# Patient Record
Sex: FEMALE | Race: BLACK OR AFRICAN AMERICAN | NOT HISPANIC OR LATINO | URBAN - METROPOLITAN AREA
[De-identification: names, ages, dates, MRNs, and addresses within clinical notes are randomized per-mention and may not be internally consistent; named-entity substitution may affect disease eponyms.]

---

## 2018-11-13 ENCOUNTER — EMERGENCY (EMERGENCY)
Facility: HOSPITAL | Age: 53
LOS: 0 days | Discharge: HOME | End: 2018-11-13
Attending: EMERGENCY MEDICINE
Payer: OTHER MISCELLANEOUS

## 2018-11-13 VITALS
HEART RATE: 61 BPM | OXYGEN SATURATION: 97 % | SYSTOLIC BLOOD PRESSURE: 130 MMHG | RESPIRATION RATE: 16 BRPM | DIASTOLIC BLOOD PRESSURE: 77 MMHG

## 2018-11-13 VITALS
HEIGHT: 68 IN | OXYGEN SATURATION: 100 % | TEMPERATURE: 98 F | WEIGHT: 179.9 LBS | DIASTOLIC BLOOD PRESSURE: 86 MMHG | HEART RATE: 62 BPM | SYSTOLIC BLOOD PRESSURE: 139 MMHG | RESPIRATION RATE: 16 BRPM
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
## 2018-11-13 DIAGNOSIS — Z98.89 OTHER SPECIFIED POSTPROCEDURAL STATES: Chronic | ICD-10-CM

## 2018-11-13 DIAGNOSIS — E11.9 TYPE 2 DIABETES MELLITUS WITHOUT COMPLICATIONS: ICD-10-CM
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
## 2018-11-13 DIAGNOSIS — Z79.4 LONG TERM (CURRENT) USE OF INSULIN: ICD-10-CM

## 2018-11-13 DIAGNOSIS — Z88.9 ALLERGY STATUS TO UNSPECIFIED DRUGS, MEDICAMENTS AND BIOLOGICAL SUBSTANCES: ICD-10-CM

## 2018-11-13 DIAGNOSIS — T14.8XXA OTHER INJURY OF UNSPECIFIED BODY REGION, INITIAL ENCOUNTER: ICD-10-CM

## 2018-11-13 DIAGNOSIS — Z91.040 LATEX ALLERGY STATUS: ICD-10-CM

## 2018-11-13 DIAGNOSIS — E66.9 OBESITY, UNSPECIFIED: ICD-10-CM

## 2018-11-13 DIAGNOSIS — M79.662 PAIN IN LEFT LOWER LEG: ICD-10-CM

## 2018-11-13 DIAGNOSIS — E78.5 HYPERLIPIDEMIA, UNSPECIFIED: ICD-10-CM

## 2018-11-13 DIAGNOSIS — G47.33 OBSTRUCTIVE SLEEP APNEA (ADULT) (PEDIATRIC): ICD-10-CM

## 2018-11-13 DIAGNOSIS — D25.9 LEIOMYOMA OF UTERUS, UNSPECIFIED: Chronic | ICD-10-CM

## 2018-11-13 DIAGNOSIS — Z99.89 DEPENDENCE ON OTHER ENABLING MACHINES AND DEVICES: ICD-10-CM

## 2018-11-13 PROCEDURE — 99053 MED SERV 10PM-8AM 24 HR FAC: CPT

## 2018-11-13 PROCEDURE — 99282 EMERGENCY DEPT VISIT SF MDM: CPT | Mod: 25

## 2018-11-13 RX ORDER — ACETAMINOPHEN 500 MG
975 TABLET ORAL ONCE
Qty: 0 | Refills: 0 | Status: COMPLETED | OUTPATIENT
Start: 2018-11-13 | End: 2018-11-13

## 2018-11-13 RX ADMIN — Medication 975 MILLIGRAM(S): at 03:11

## 2018-11-13 NOTE — ED ADULT NURSE NOTE - NSIMPLEMENTINTERV_GEN_ALL_ED
Implemented All Fall Risk Interventions:  [place] to call system. Call bell, personal items and telephone within reach. Instruct patient to call for assistance. Room bathroom lighting operational. Non-slip footwear when patient is off stretcher. Physically safe environment: no spills, clutter or unnecessary equipment. Stretcher in lowest position, wheels locked, appropriate side rails in place. Provide visual cue, wrist band, yellow gown, etc. Monitor gait and stability. Monitor for mental status changes and reorient to person, place, and time. Review medications for side effects contributing to fall risk. Reinforce activity limits and safety measures with patient and family.

## 2018-11-13 NOTE — ED PROVIDER NOTE - MEDICAL DECISION MAKING DETAILS
patient pw superficial skin abrasion and likely contusion to the shin muscle. dc with nsaid therapy.

## 2018-11-13 NOTE — ED PROVIDER NOTE - PHYSICAL EXAMINATION
Gen: Alert, NAD  Head: NC, AT   Eyes: PERRL, EOMI, normal lids/conjunctiva  ENT: normal hearing, patent oropharynx without erythema/exudate, uvula midline  Neck: supple, no tenderness, Trachea midline  Pulm: Bilateral BS, normal resp effort, no wheeze/stridor/retractions  CV: RRR, no M/R/G, 2+ radial and dp pulses bl, no edema  Abd: soft, NT/ND, +BS, no hepatosplenomegaly  Mskel: extremities x4 with normal ROM and no joint effusions. no ctl spine ttp.   Skin: abrasion left anterior shin. very superficial   Neuro: AAOx3, no sensory/motor deficits, CN 2-12 intact

## 2018-11-13 NOTE — ED PROVIDER NOTE - OBJECTIVE STATEMENT
Pertinent PMH/PSH/FHx/SHx and Review of Systems contained within:  53F no relevant med hx pw left shin pain sp mech trip and fall onto the bus. she did not hit head or sustain loc. there was no bleeding. patient did not take anything for pain. there is an abrasion on the left shin. she is ambulatory  Fh and Sh not otherwise contributory  ROS otherwise negative

## 2018-11-13 NOTE — ED ADULT TRIAGE NOTE - CHIEF COMPLAINT QUOTE
BIBA, from work, pt c/o L-shin pain/abrasion .  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  unknown tetatnus.  post- menopausal

## 2018-11-13 NOTE — ED ADULT NURSE NOTE - PSH
Fibroids  s/p uterine artery embolization 2008  S/P D&C (status post dilation and curettage)  myomectomy 2008  S/P D&C (status post dilation and curettage)  TOP A x1  S/P myomectomy  abdominal 1989/ 2004

## 2018-11-13 NOTE — ED ADULT NURSE NOTE - PMH
Diabetes  diagnosed 2009   fasting 110  metformin 2x day  Environmental allergies  mucinex prn  Hiatal hernia    Hyperlipidemia    Latex allergy    Obesity    JUAQUIN on CPAP

## 2020-05-22 ENCOUNTER — TRANSCRIPTION ENCOUNTER (OUTPATIENT)
Age: 55
End: 2020-05-22

## 2020-07-05 ENCOUNTER — EMERGENCY (EMERGENCY)
Facility: HOSPITAL | Age: 55
LOS: 0 days | Discharge: HOME | End: 2020-07-06
Attending: EMERGENCY MEDICINE
Payer: COMMERCIAL

## 2020-07-05 VITALS
TEMPERATURE: 98 F | SYSTOLIC BLOOD PRESSURE: 130 MMHG | DIASTOLIC BLOOD PRESSURE: 73 MMHG | OXYGEN SATURATION: 97 % | RESPIRATION RATE: 17 BRPM | HEART RATE: 62 BPM | HEIGHT: 68 IN | WEIGHT: 190.04 LBS

## 2020-07-05 DIAGNOSIS — Z98.89 OTHER SPECIFIED POSTPROCEDURAL STATES: Chronic | ICD-10-CM

## 2020-07-05 DIAGNOSIS — D25.9 LEIOMYOMA OF UTERUS, UNSPECIFIED: Chronic | ICD-10-CM

## 2020-07-05 PROCEDURE — 99285 EMERGENCY DEPT VISIT HI MDM: CPT

## 2020-07-05 RX ORDER — KETOROLAC TROMETHAMINE 30 MG/ML
30 SYRINGE (ML) INJECTION ONCE
Refills: 0 | Status: DISCONTINUED | OUTPATIENT
Start: 2020-07-05 | End: 2020-07-05

## 2020-07-05 RX ORDER — SODIUM CHLORIDE 9 MG/ML
1000 INJECTION INTRAMUSCULAR; INTRAVENOUS; SUBCUTANEOUS ONCE
Refills: 0 | Status: COMPLETED | OUTPATIENT
Start: 2020-07-05 | End: 2020-07-05

## 2020-07-05 RX ORDER — ONDANSETRON 8 MG/1
4 TABLET, FILM COATED ORAL ONCE
Refills: 0 | Status: COMPLETED | OUTPATIENT
Start: 2020-07-05 | End: 2020-07-05

## 2020-07-05 NOTE — ED PROVIDER NOTE - CARE PROVIDER_API CALL
Hossein De Oliveira  ORTHOPAEDIC SURGERY  [address] [place] [address]  Phone: [phone]  Fax: [phone]  Follow Up Time:

## 2020-07-05 NOTE — ED PROVIDER NOTE - PATIENT PORTAL LINK FT
You can access the FollowMyHealth Patient Portal offered by [facility] by registering at the following website: [email or web address]. By joining [employer]’s FollowMyHealth portal, you will also be able to view your health information using other applications (apps) compatible with our system.

## 2020-07-05 NOTE — ED ADULT TRIAGE NOTE - TEMPERATURE IN CELSIUS (DEGREES C)
Detail Level: Simple
Body Location Override (Optional - Billing Will Still Be Based On Selected Body Map Location If Applicable): left posterior shoulder
Add 15521 Cpt? (Important Note: In 2017 The Use Of 57237 Is Being Tracked By Cms To Determine Future Global Period Reimbursement For Global Periods): yes
36.7

## 2020-07-05 NOTE — ED PROVIDER NOTE - OBJECTIVE STATEMENT
Pertinent PMH/PSH/FHx/SHx and Review of Systems contained within:    55yo F w PMH of HL, hiatal hernia, obesity s/p gastric sleeve, s/p myomectomy x2 presents to ED for eval of back pain today.  Pt states she has been moving furniture and has been having mild pain, which she has been taking 2 tabs of tylenol for.  Today pt had episode of more severe back pain.  Pt states she has COVID Ab.  Pt states Pertinent PMH/PSH/FHx/SHx and Review of Systems contained within:    53yo F w PMH of HL, hiatal hernia, obesity s/p gastric sleeve, s/p myomectomy x2 presents to ED for eval of back pain today.  Pt states she has been moving furniture and has been having mild pain, which she has been taking 2 tabs of tylenol daily for w limited relief.  Today pt had episode of more severe back pain, described as sharp, then resolved.  Pt states she previously had COVID, +Ab. Pt states when pain occurred she had nausea, no vomiting.  Pain then felt like it radiated to abdomen.  Currently in ED pt states pain not as severe.    No fever/chills, No photophobia/eye pain/changes in vision, No ear pain/sore throat/dysphagia, No chest pain/palpitations, no SOB/cough/wheeze/stridor, No abdominal pain, +back pain, no rash, no changes in neurological status/function.

## 2020-07-05 NOTE — ED PROVIDER NOTE - NSFOLLOWUPINSTRUCTIONS_ED_ALL_ED_FT
please take medications as prescribed    follow up with your PMD & orthopedics    return to hospital if you feel worse

## 2020-07-05 NOTE — ED PROVIDER NOTE - CLINICAL SUMMARY MEDICAL DECISION MAKING FREE TEXT BOX
Pt states she is previously aware of liver hemangioma Pt improved in ED w toradol.  Discussed results and outcome of testing with the patient, given copy as well.  Pt states she is previously aware of liver hemangioma, given follow up for back as well.  Patient advised to please follow up with their primary care doctor within the next 24 hours and return to the Emergency Department for worsening symptoms or any other concerns.  Patient advised that their doctor may call  to follow up on the specific results of the tests performed today in the emergency department.

## 2020-07-05 NOTE — ED PROVIDER NOTE - PHYSICAL EXAMINATION
Gen: Alert, NAD, speaking in complete sentences  Head: NC, AT, EOMI, normal lids/conjunctiva  ENT: normal hearing, patent oropharynx, MMM  Neck: supple, no tenderness/meningismus, FROM, Trachea midline  Pulm: Bilateral clear BS, normal resp effort, no wheeze/stridor/retractions  CV: RRR, no M/R/G, +dist pulses  Abd: soft, NT/ND, +BS, no guarding/rebound tenderness, no CVAT  Mskel: no edema/erythema/cyanosis  Skin: no rash  Neuro: no sensory/motor deficits

## 2020-07-06 ENCOUNTER — TRANSCRIPTION ENCOUNTER (OUTPATIENT)
Age: 55
End: 2020-07-06

## 2020-07-06 VITALS
HEART RATE: 66 BPM | TEMPERATURE: 98 F | OXYGEN SATURATION: 99 % | DIASTOLIC BLOOD PRESSURE: 77 MMHG | RESPIRATION RATE: 16 BRPM | SYSTOLIC BLOOD PRESSURE: 143 MMHG

## 2020-07-06 DIAGNOSIS — Z98.84 BARIATRIC SURGERY STATUS: Chronic | ICD-10-CM

## 2020-07-06 LAB
ALBUMIN SERPL ELPH-MCNC: 3.5 G/DL — SIGNIFICANT CHANGE UP (ref 3.3–5)
ALP SERPL-CCNC: 77 U/L — SIGNIFICANT CHANGE UP (ref 40–120)
ALT FLD-CCNC: 22 U/L — SIGNIFICANT CHANGE UP (ref 12–78)
ANION GAP SERPL CALC-SCNC: 4 MMOL/L — LOW (ref 5–17)
APPEARANCE UR: CLEAR — SIGNIFICANT CHANGE UP
APTT BLD: 31.4 SEC — SIGNIFICANT CHANGE UP (ref 27.5–35.5)
AST SERPL-CCNC: 25 U/L — SIGNIFICANT CHANGE UP (ref 15–37)
BACTERIA # UR AUTO: ABNORMAL
BASOPHILS # BLD AUTO: 0.03 K/UL — SIGNIFICANT CHANGE UP (ref 0–0.2)
BASOPHILS NFR BLD AUTO: 0.4 % — SIGNIFICANT CHANGE UP (ref 0–2)
BILIRUB SERPL-MCNC: 0.6 MG/DL — SIGNIFICANT CHANGE UP (ref 0.2–1.2)
BILIRUB UR-MCNC: NEGATIVE — SIGNIFICANT CHANGE UP
BUN SERPL-MCNC: 13 MG/DL — SIGNIFICANT CHANGE UP (ref 7–23)
CALCIUM SERPL-MCNC: 8.8 MG/DL — SIGNIFICANT CHANGE UP (ref 8.5–10.1)
CHLORIDE SERPL-SCNC: 110 MMOL/L — HIGH (ref 96–108)
CO2 SERPL-SCNC: 29 MMOL/L — SIGNIFICANT CHANGE UP (ref 22–31)
COLOR SPEC: YELLOW — SIGNIFICANT CHANGE UP
CREAT SERPL-MCNC: 0.61 MG/DL — SIGNIFICANT CHANGE UP (ref 0.5–1.3)
DIFF PNL FLD: ABNORMAL
EOSINOPHIL # BLD AUTO: 0.08 K/UL — SIGNIFICANT CHANGE UP (ref 0–0.5)
EOSINOPHIL NFR BLD AUTO: 1.1 % — SIGNIFICANT CHANGE UP (ref 0–6)
EPI CELLS # UR: SIGNIFICANT CHANGE UP
GLUCOSE SERPL-MCNC: 161 MG/DL — HIGH (ref 70–99)
GLUCOSE UR QL: NEGATIVE MG/DL — SIGNIFICANT CHANGE UP
HCG SERPL-ACNC: <1 MIU/ML — SIGNIFICANT CHANGE UP
HCT VFR BLD CALC: 35.5 % — SIGNIFICANT CHANGE UP (ref 34.5–45)
HGB BLD-MCNC: 11.1 G/DL — LOW (ref 11.5–15.5)
IMM GRANULOCYTES NFR BLD AUTO: 0.4 % — SIGNIFICANT CHANGE UP (ref 0–1.5)
INR BLD: 1.1 RATIO — SIGNIFICANT CHANGE UP (ref 0.88–1.16)
KETONES UR-MCNC: NEGATIVE — SIGNIFICANT CHANGE UP
LEUKOCYTE ESTERASE UR-ACNC: ABNORMAL
LIDOCAIN IGE QN: 154 U/L — SIGNIFICANT CHANGE UP (ref 73–393)
LYMPHOCYTES # BLD AUTO: 1.45 K/UL — SIGNIFICANT CHANGE UP (ref 1–3.3)
LYMPHOCYTES # BLD AUTO: 20.1 % — SIGNIFICANT CHANGE UP (ref 13–44)
MCHC RBC-ENTMCNC: 27.3 PG — SIGNIFICANT CHANGE UP (ref 27–34)
MCHC RBC-ENTMCNC: 31.3 GM/DL — LOW (ref 32–36)
MCV RBC AUTO: 87.2 FL — SIGNIFICANT CHANGE UP (ref 80–100)
MONOCYTES # BLD AUTO: 0.56 K/UL — SIGNIFICANT CHANGE UP (ref 0–0.9)
MONOCYTES NFR BLD AUTO: 7.8 % — SIGNIFICANT CHANGE UP (ref 2–14)
NEUTROPHILS # BLD AUTO: 5.07 K/UL — SIGNIFICANT CHANGE UP (ref 1.8–7.4)
NEUTROPHILS NFR BLD AUTO: 70.2 % — SIGNIFICANT CHANGE UP (ref 43–77)
NITRITE UR-MCNC: NEGATIVE — SIGNIFICANT CHANGE UP
NRBC # BLD: 0 /100 WBCS — SIGNIFICANT CHANGE UP (ref 0–0)
PH UR: 5 — SIGNIFICANT CHANGE UP (ref 5–8)
PLATELET # BLD AUTO: 284 K/UL — SIGNIFICANT CHANGE UP (ref 150–400)
POTASSIUM SERPL-MCNC: 4.2 MMOL/L — SIGNIFICANT CHANGE UP (ref 3.5–5.3)
POTASSIUM SERPL-SCNC: 4.2 MMOL/L — SIGNIFICANT CHANGE UP (ref 3.5–5.3)
PROT SERPL-MCNC: 7.4 GM/DL — SIGNIFICANT CHANGE UP (ref 6–8.3)
PROT UR-MCNC: 15 MG/DL
PROTHROM AB SERPL-ACNC: 12.3 SEC — SIGNIFICANT CHANGE UP (ref 10.6–13.6)
RBC # BLD: 4.07 M/UL — SIGNIFICANT CHANGE UP (ref 3.8–5.2)
RBC # FLD: 13.5 % — SIGNIFICANT CHANGE UP (ref 10.3–14.5)
RBC CASTS # UR COMP ASSIST: ABNORMAL /HPF (ref 0–4)
SARS-COV-2 RNA SPEC QL NAA+PROBE: SIGNIFICANT CHANGE UP
SODIUM SERPL-SCNC: 143 MMOL/L — SIGNIFICANT CHANGE UP (ref 135–145)
SP GR SPEC: 1.02 — SIGNIFICANT CHANGE UP (ref 1.01–1.02)
UROBILINOGEN FLD QL: 1 MG/DL
WBC # BLD: 7.22 K/UL — SIGNIFICANT CHANGE UP (ref 3.8–10.5)
WBC # FLD AUTO: 7.22 K/UL — SIGNIFICANT CHANGE UP (ref 3.8–10.5)
WBC UR QL: SIGNIFICANT CHANGE UP

## 2020-07-06 PROCEDURE — 74176 CT ABD & PELVIS W/O CONTRAST: CPT | Mod: 26

## 2020-07-06 PROCEDURE — 71045 X-RAY EXAM CHEST 1 VIEW: CPT | Mod: 26

## 2020-07-06 RX ORDER — AZITHROMYCIN 500 MG/1
500 TABLET, FILM COATED ORAL ONCE
Refills: 0 | Status: COMPLETED | OUTPATIENT
Start: 2020-07-06 | End: 2020-07-06

## 2020-07-06 RX ORDER — AZITHROMYCIN 500 MG/1
1 TABLET, FILM COATED ORAL
Qty: 4 | Refills: 0
Start: 2020-07-06 | End: 2020-07-09

## 2020-07-06 RX ORDER — METHOCARBAMOL 500 MG/1
1 TABLET, FILM COATED ORAL
Qty: 15 | Refills: 0
Start: 2020-07-06 | End: 2020-07-10

## 2020-07-06 RX ADMIN — SODIUM CHLORIDE 1000 MILLILITER(S): 9 INJECTION INTRAMUSCULAR; INTRAVENOUS; SUBCUTANEOUS at 00:54

## 2020-07-06 RX ADMIN — ONDANSETRON 4 MILLIGRAM(S): 8 TABLET, FILM COATED ORAL at 00:53

## 2020-07-06 RX ADMIN — Medication 1 TABLET(S): at 03:04

## 2020-07-06 RX ADMIN — AZITHROMYCIN 500 MILLIGRAM(S): 500 TABLET, FILM COATED ORAL at 03:04

## 2020-07-06 RX ADMIN — Medication 30 MILLIGRAM(S): at 00:53

## 2020-07-06 NOTE — ED ADULT NURSE NOTE - OBJECTIVE STATEMENT
C/O new onset of left flank pain while seated on the couch. Pain surged with severe nausea as she was about to get up of the couch.

## 2020-07-06 NOTE — ED ADULT NURSE NOTE - PSH
Fibroids  s/p uterine artery embolization 2008  H/O bariatric surgery  gastric SLEEVE  S/P D&C (status post dilation and curettage)  myomectomy 2008  S/P D&C (status post dilation and curettage)  TOP A x1  S/P myomectomy  abdominal 1989/ 2004

## 2020-07-07 DIAGNOSIS — Z91.040 LATEX ALLERGY STATUS: ICD-10-CM

## 2020-07-07 DIAGNOSIS — J18.8 OTHER PNEUMONIA, UNSPECIFIED ORGANISM: ICD-10-CM

## 2020-07-07 DIAGNOSIS — R10.9 UNSPECIFIED ABDOMINAL PAIN: ICD-10-CM

## 2020-07-07 DIAGNOSIS — M54.9 DORSALGIA, UNSPECIFIED: ICD-10-CM

## 2020-07-07 LAB
CULTURE RESULTS: SIGNIFICANT CHANGE UP
SPECIMEN SOURCE: SIGNIFICANT CHANGE UP

## 2020-08-11 ENCOUNTER — EMERGENCY (EMERGENCY)
Facility: HOSPITAL | Age: 55
LOS: 0 days | Discharge: HOME | End: 2020-08-11
Attending: EMERGENCY MEDICINE
Payer: COMMERCIAL

## 2020-08-11 VITALS
HEART RATE: 68 BPM | OXYGEN SATURATION: 98 % | DIASTOLIC BLOOD PRESSURE: 60 MMHG | SYSTOLIC BLOOD PRESSURE: 116 MMHG | RESPIRATION RATE: 18 BRPM | TEMPERATURE: 99 F

## 2020-08-11 VITALS
HEART RATE: 90 BPM | OXYGEN SATURATION: 98 % | RESPIRATION RATE: 20 BRPM | DIASTOLIC BLOOD PRESSURE: 65 MMHG | SYSTOLIC BLOOD PRESSURE: 153 MMHG

## 2020-08-11 DIAGNOSIS — Z91.040 LATEX ALLERGY STATUS: ICD-10-CM

## 2020-08-11 DIAGNOSIS — Z98.84 BARIATRIC SURGERY STATUS: Chronic | ICD-10-CM

## 2020-08-11 DIAGNOSIS — Z98.89 OTHER SPECIFIED POSTPROCEDURAL STATES: Chronic | ICD-10-CM

## 2020-08-11 DIAGNOSIS — M25.552 PAIN IN LEFT HIP: ICD-10-CM

## 2020-08-11 DIAGNOSIS — Z79.84 LONG TERM (CURRENT) USE OF ORAL HYPOGLYCEMIC DRUGS: ICD-10-CM
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
## 2020-08-11 DIAGNOSIS — K44.9 DIAPHRAGMATIC HERNIA WITHOUT OBSTRUCTION OR GANGRENE: ICD-10-CM

## 2020-08-11 DIAGNOSIS — E78.5 HYPERLIPIDEMIA, UNSPECIFIED: ICD-10-CM

## 2020-08-11 DIAGNOSIS — E11.9 TYPE 2 DIABETES MELLITUS WITHOUT COMPLICATIONS: ICD-10-CM

## 2020-08-11 DIAGNOSIS — S70.02XA CONTUSION OF LEFT HIP, INITIAL ENCOUNTER: ICD-10-CM

## 2020-08-11 DIAGNOSIS — G47.33 OBSTRUCTIVE SLEEP APNEA (ADULT) (PEDIATRIC): ICD-10-CM

## 2020-08-11 DIAGNOSIS — D25.9 LEIOMYOMA OF UTERUS, UNSPECIFIED: Chronic | ICD-10-CM

## 2020-08-11 PROCEDURE — 72100 X-RAY EXAM L-S SPINE 2/3 VWS: CPT | Mod: 26

## 2020-08-11 PROCEDURE — 99284 EMERGENCY DEPT VISIT MOD MDM: CPT

## 2020-08-11 PROCEDURE — 73502 X-RAY EXAM HIP UNI 2-3 VIEWS: CPT | Mod: 26,LT

## 2020-08-11 RX ORDER — IBUPROFEN 200 MG
1 TABLET ORAL
Qty: 15 | Refills: 0
Start: 2020-08-11 | End: 2020-08-15

## 2020-08-11 RX ORDER — KETOROLAC TROMETHAMINE 30 MG/ML
30 SYRINGE (ML) INJECTION ONCE
Refills: 0 | Status: DISCONTINUED | OUTPATIENT
Start: 2020-08-11 | End: 2020-08-11

## 2020-08-11 RX ADMIN — Medication 30 MILLIGRAM(S): at 09:25

## 2020-08-11 RX ADMIN — Medication 30 MILLIGRAM(S): at 09:36

## 2020-08-11 NOTE — ED PROVIDER NOTE - PATIENT PORTAL LINK FT
You can access the FollowMyHealth Patient Portal offered by [facility] by registering at the following website: [email or web address]. By joining [employer]’s FollowMyHealth portal, you will also be able to view your health information using other applications (apps) compatible with our system.

## 2020-08-11 NOTE — ED ADULT NURSE NOTE - NSIMPLEMENTINTERV_GEN_ALL_ED
Implemented All Universal Safety Interventions:  [place] to call system. Call bell, personal items and telephone within reach. Instruct patient to call for assistance. Room bathroom lighting operational. Non-slip footwear when patient is off stretcher. Physically safe environment: no spills, clutter or unnecessary equipment. Stretcher in lowest position, wheels locked, appropriate side rails in place.

## 2020-08-11 NOTE — ED PROVIDER NOTE - OBJECTIVE STATEMENT
55 years old female brought in by her mom c/o left hip and buttock pain after she slipped and fell on her buttock 19:00 pm last night. Pt is denies trauma to the head, headache, loc, dizziness, neck pain, blurred visions, light sensitivities, focal/distal weakness or numbness, cough, sob, chest pain, nausea, vomiting, fever, chills, abd pain, dysuria, hematuria, vaginal spotting or discharge or irregular bowel movements. Pt's last menstrual cycle was 5 years ago sts she is not at risk of being pregnant.

## 2020-08-11 NOTE — ED ADULT TRIAGE NOTE - CHIEF COMPLAINT QUOTE
pt s/p mechanical fall landed on buttocks, did not strike her head, +Left hip pain, denies anticoagulation use, ambulatory in triage

## 2020-08-11 NOTE — ED PROVIDER NOTE - PROGRESS NOTE DETAILS
Pt sts she is feeling better again pt is able to ambulate without assists. Pt is given and explained all reports of xray and advised to follow up with ortho and return if symptoms persist or worsen.

## 2020-08-11 NOTE — ED PROVIDER NOTE - CONSTITUTIONAL, MLM
normal... Well appearing, awake, alert, oriented to person, place, time/situation and in no apparent distress. Speaking in clear full sentences no nasal flaring no sentences no nasal flaring no shoulders retractions no diaphoresis, appears comfortable sitting up in the chair in a bright light room

## 2020-08-11 NOTE — ED PROVIDER NOTE - MUSCULOSKELETAL NECK EXAM
No vertebral point tenderness no pain on movement/trachea midline/no deformity, pain or tenderness. no restriction of movement/supple

## 2020-11-11 ENCOUNTER — EMERGENCY (EMERGENCY)
Facility: HOSPITAL | Age: 55
LOS: 0 days | Discharge: HOME | End: 2020-11-11
Payer: COMMERCIAL

## 2020-11-11 VITALS
TEMPERATURE: 98 F | WEIGHT: 210.1 LBS | DIASTOLIC BLOOD PRESSURE: 94 MMHG | HEIGHT: 68 IN | HEART RATE: 58 BPM | OXYGEN SATURATION: 100 % | RESPIRATION RATE: 16 BRPM | SYSTOLIC BLOOD PRESSURE: 153 MMHG

## 2020-11-11 DIAGNOSIS — D25.9 LEIOMYOMA OF UTERUS, UNSPECIFIED: Chronic | ICD-10-CM

## 2020-11-11 DIAGNOSIS — Z91.040 LATEX ALLERGY STATUS: ICD-10-CM

## 2020-11-11 DIAGNOSIS — Z98.89 OTHER SPECIFIED POSTPROCEDURAL STATES: Chronic | ICD-10-CM

## 2020-11-11 DIAGNOSIS — E78.5 HYPERLIPIDEMIA, UNSPECIFIED: ICD-10-CM

## 2020-11-11 DIAGNOSIS — G47.33 OBSTRUCTIVE SLEEP APNEA (ADULT) (PEDIATRIC): ICD-10-CM

## 2020-11-11 DIAGNOSIS — E11.9 TYPE 2 DIABETES MELLITUS WITHOUT COMPLICATIONS: ICD-10-CM

## 2020-11-11 DIAGNOSIS — M54.9 DORSALGIA, UNSPECIFIED: ICD-10-CM

## 2020-11-11 DIAGNOSIS — Z79.84 LONG TERM (CURRENT) USE OF ORAL HYPOGLYCEMIC DRUGS: ICD-10-CM

## 2020-11-11 DIAGNOSIS — Z98.84 BARIATRIC SURGERY STATUS: Chronic | ICD-10-CM

## 2020-11-11 DIAGNOSIS — M54.5 LOW BACK PAIN: ICD-10-CM

## 2020-11-11 DIAGNOSIS — K44.9 DIAPHRAGMATIC HERNIA WITHOUT OBSTRUCTION OR GANGRENE: ICD-10-CM

## 2020-11-11 PROCEDURE — 99284 EMERGENCY DEPT VISIT MOD MDM: CPT

## 2020-11-11 RX ORDER — DEXAMETHASONE 0.5 MG/5ML
10 ELIXIR ORAL ONCE
Refills: 0 | Status: COMPLETED | OUTPATIENT
Start: 2020-11-11 | End: 2020-11-11

## 2020-11-11 RX ORDER — CYCLOBENZAPRINE HYDROCHLORIDE 10 MG/1
1 TABLET, FILM COATED ORAL
Qty: 15 | Refills: 0
Start: 2020-11-11 | End: 2020-11-15

## 2020-11-11 RX ORDER — DIAZEPAM 5 MG
5 TABLET ORAL ONCE
Refills: 0 | Status: DISCONTINUED | OUTPATIENT
Start: 2020-11-11 | End: 2020-11-11

## 2020-11-11 RX ORDER — LIDOCAINE 4 G/100G
1 CREAM TOPICAL
Qty: 10 | Refills: 0
Start: 2020-11-11 | End: 2020-11-15

## 2020-11-11 RX ORDER — KETOROLAC TROMETHAMINE 30 MG/ML
30 SYRINGE (ML) INJECTION ONCE
Refills: 0 | Status: DISCONTINUED | OUTPATIENT
Start: 2020-11-11 | End: 2020-11-11

## 2020-11-11 RX ADMIN — Medication 30 MILLIGRAM(S): at 17:21

## 2020-11-11 RX ADMIN — Medication 5 MILLIGRAM(S): at 17:20

## 2020-11-11 RX ADMIN — Medication 10 MILLIGRAM(S): at 17:21

## 2020-11-11 NOTE — ED PROVIDER NOTE - CLINICAL SUMMARY MEDICAL DECISION MAKING FREE TEXT BOX
56 yo female with h/o HLD presents to the ED c/o lower back pain and spasms since 1 pm. Patient admits to similar pain in past where shes taken nsaids and muscle relaxants with improvement. 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 No fall or trauma. Patient admits to a back injury in August. Pain is constant, sharp, bilateral lower back, nonradiating. Denies fever, chills, chest pain, sob, abd pain, N/V, urinary sxs, flank pain. no UE/LE weakness or paresthesias, no saddle anesthesia, no bowel or bladder incontinence/retention, no prior back surgery, no h/o IVDA. A/P: no back pain red flag or symptoms. pain improved after meds, patient ambulating in ED. no loss of LE strength. Will dc with medrol dose pack and muscle relaxant, follow up with ortho/spine. Patient understands return precautions.

## 2020-11-11 NOTE — ED PROVIDER NOTE - PROGRESS NOTE DETAILS
Patient feeling better, able to ambulate with some discomfort. Will dc with nsaids, muscle relaxant, follow up with ortho/spine.

## 2020-11-11 NOTE — ED ADULT NURSE NOTE - SUICIDE SCREENING QUESTION 3
Joreg sanchez an apt  See who her  has seen in cardiology in the past -h e will need pre op clearance for eye surgery and should see cardiology.    No

## 2020-11-11 NOTE — ED PROVIDER NOTE - OBJECTIVE STATEMENT
54 yo female with h/o HLD presents to the ED c/o lower back pain and spasms since 1 pm. Patient admits to similar pain in past where shes taken nsaids and muscle relaxants with improvement. 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 No fall or trauma. Patient admits to a back injury in August. Pain is constant, sharp, bilateral lower back, nonradiating. Denies fever, chills, chest pain, sob, abd pain, N/V, urinary sxs, flank pain. no UE/LE weakness or paresthesias, no saddle anesthesia, no bowel or bladder incontinence/retention, no prior back surgery, no h/o IVDA

## 2020-11-11 NOTE — ED PROVIDER NOTE - SKIN, MLM
Skin normal color for race, warm, dry and intact. No evidence of rash. cap refill <2 sec bilaterally.

## 2020-11-11 NOTE — ED PROVIDER NOTE - PATIENT PORTAL LINK FT
You can access the FollowMyHealth Patient Portal offered by [facility] by registering at the following website: [email or web address]. By joining [employer]’s FollowMyHealth portal, you will also be able to view your health information using other applications (apps) compatible with our system.

## 2020-11-11 NOTE — ED PROVIDER NOTE - MUSCULOSKELETAL MINIMAL EXAM
+ TTP bilateral lumbar paraspinal muscles. no midline tenderness. LE strength intact. + left sided straight leg raise. dp pulses equal and intact bilaterally.

## 2020-11-11 NOTE — ED ADULT NURSE NOTE - NSIMPLEMENTINTERV_GEN_ALL_ED
Implemented All Fall Risk Interventions:  [place] to call system. Call bell, personal items and telephone within reach. Instruct patient to call for assistance. Room bathroom lighting operational. Non-slip footwear when patient is off stretcher. Physically safe environment: no spills, clutter or unnecessary equipment. Stretcher in lowest position, wheels locked, appropriate side rails in place. Provide visual cue, wrist band, yellow gown, etc. Monitor gait and stability. Monitor for mental status changes and reorient to person, place, and time. Review medications for side effects contributing to fall risk. Reinforce activity limits and safety measures with patient and family.

## 2020-11-11 NOTE — ED PROVIDER NOTE - NEUROLOGICAL, MLM
Alert and oriented, no focal deficits, no motor or sensory deficits. LE sensation equal and intact bilaterally. LE strength 5/5 bilaterally.

## 2020-11-11 NOTE — ED ADULT NURSE NOTE - OBJECTIVE STATEMENT
Fall in August on tailbone, pain since last night and took meds. Went to work today as a , was sitting all day with increased pain then unable to "get out of my seat" No recent trauma. No injuries. No deformities. No further complaints.

## 2020-11-11 NOTE — ED PROVIDER NOTE - CARE PROVIDER_API CALL
Yajaira Hankins  ORTHOPAEDIC SURGERY  [address] [facility] [address] [address] [place] [address]  Phone: [phone]  Fax: [phone]  Follow Up Time: 1-3 Days

## 2022-03-20 NOTE — ED ADULT NURSE NOTE - NSSEPSISSUSPECTED_ED_A_ED
[de-identified] : \par 3/18/22 EKG: SR, borderline LAE [de-identified] : \par 1/31/22 TTE: normal LV and RV size and function, no sig valvular disease, no pericardial effusion No

## 2022-06-19 ENCOUNTER — NON-APPOINTMENT (OUTPATIENT)
Age: 57
End: 2022-06-19

## 2022-07-07 ENCOUNTER — EMERGENCY (EMERGENCY)
Facility: HOSPITAL | Age: 57
LOS: 1 days | Discharge: HOME | End: 2022-07-07
Attending: EMERGENCY MEDICINE
Payer: COMMERCIAL

## 2022-07-07 VITALS
RESPIRATION RATE: 18 BRPM | OXYGEN SATURATION: 96 % | WEIGHT: 195.11 LBS | SYSTOLIC BLOOD PRESSURE: 163 MMHG | TEMPERATURE: 98 F | HEART RATE: 59 BPM | HEIGHT: 68 IN | DIASTOLIC BLOOD PRESSURE: 83 MMHG

## 2022-07-07 DIAGNOSIS — Z98.89 OTHER SPECIFIED POSTPROCEDURAL STATES: Chronic | ICD-10-CM

## 2022-07-07 DIAGNOSIS — Z98.84 BARIATRIC SURGERY STATUS: Chronic | ICD-10-CM

## 2022-07-07 DIAGNOSIS — D25.9 LEIOMYOMA OF UTERUS, UNSPECIFIED: Chronic | ICD-10-CM

## 2022-07-07 PROCEDURE — 99284 EMERGENCY DEPT VISIT MOD MDM: CPT

## 2022-07-07 PROCEDURE — 93010 ELECTROCARDIOGRAM REPORT: CPT

## 2022-07-07 PROCEDURE — 71101 X-RAY EXAM UNILAT RIBS/CHEST: CPT | Mod: 26

## 2022-07-07 PROCEDURE — 93005 ELECTROCARDIOGRAM TRACING: CPT

## 2022-07-07 PROCEDURE — 71101 X-RAY EXAM UNILAT RIBS/CHEST: CPT

## 2022-07-07 PROCEDURE — 99283 EMERGENCY DEPT VISIT LOW MDM: CPT

## 2022-07-07 RX ORDER — ACETAMINOPHEN 500 MG
975 TABLET ORAL ONCE
Refills: 0 | Status: COMPLETED | OUTPATIENT
Start: 2022-07-07 | End: 2022-07-07

## 2022-07-07 RX ADMIN — Medication 975 MILLIGRAM(S): at 16:04

## 2022-07-07 NOTE — ED PROVIDER NOTE - NSICDXPASTMEDICALHX_GEN_ALL_CORE_FT
PAST MEDICAL HISTORY:  Diabetes diagnosed 2009   fasting 110  metformin 2x day    Environmental allergies mucinex prn    Hiatal hernia     Hyperlipidemia     Latex allergy     Obesity     JUAQUIN on CPAP

## 2022-07-07 NOTE — ED ADULT NURSE NOTE - OBJECTIVE STATEMENT
Detail Level: Detailed
Pt. tripped and fell onto car hitting left chest area today when putting money ticket in car.

## 2022-07-07 NOTE — ED ADULT NURSE NOTE - NSIMPLEMENTINTERV_GEN_ALL_ED
Implemented All Fall Risk Interventions:  [place] to call system. Call bell, personal items and telephone within reach. Instruct patient to call for assistance. Room bathroom lighting operational. Non-slip footwear when patient is off stretcher. Physically safe environment: no spills, clutter or unnecessary equipment. Stretcher in lowest position, wheels locked, appropriate side rails in place. Provide visual cue, wrist band, yellow gown, etc. Monitor gait and stability. Monitor for mental status changes and reorient to person, place, and time. Review medications for side effects contributing to fall risk. Reinforce activity limits and safety measures with patient and family.

## 2022-07-07 NOTE — ED PROVIDER NOTE - MUSCULOSKELETAL, MLM
No spinal tenderness. Full range of motion of bilateral arms, including elbows, shoulders, and bilateral hips and knees.

## 2022-07-07 NOTE — ED PROVIDER NOTE - NSICDXPASTSURGICALHX_GEN_ALL_CORE_FT
PAST SURGICAL HISTORY:  Fibroids s/p uterine artery embolization 2008    H/O bariatric surgery gastric SLEEVE    S/P D&C (status post dilation and curettage) TOP A x1    S/P D&C (status post dilation and curettage) myomectomy 2008    S/P myomectomy abdominal 1989/ 2004

## 2022-07-07 NOTE — ED PROVIDER NOTE - CLINICAL SUMMARY MEDICAL DECISION MAKING FREE TEXT BOX
Will do chest X-Ray to evaluate for rib fracture, do paint control, and likely discharge. EKG unremarkable.

## 2022-08-04 ENCOUNTER — NON-APPOINTMENT (OUTPATIENT)
Age: 57
End: 2022-08-04

## 2022-12-09 ENCOUNTER — NON-APPOINTMENT (OUTPATIENT)
Age: 57
End: 2022-12-09

## 2022-12-13 ENCOUNTER — NON-APPOINTMENT (OUTPATIENT)
Age: 57
End: 2022-12-13

## 2023-04-19 ENCOUNTER — EMERGENCY (EMERGENCY)
Facility: HOSPITAL | Age: 58
LOS: 0 days | Discharge: HOME | End: 2023-04-20
Attending: STUDENT IN AN ORGANIZED HEALTH CARE EDUCATION/TRAINING PROGRAM
Payer: OTHER MISCELLANEOUS

## 2023-04-19 VITALS
OXYGEN SATURATION: 98 % | HEIGHT: 68 IN | RESPIRATION RATE: 18 BRPM | DIASTOLIC BLOOD PRESSURE: 84 MMHG | SYSTOLIC BLOOD PRESSURE: 126 MMHG | HEART RATE: 68 BPM | TEMPERATURE: 98 F | WEIGHT: 216.05 LBS

## 2023-04-19 DIAGNOSIS — D25.9 LEIOMYOMA OF UTERUS, UNSPECIFIED: Chronic | ICD-10-CM
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
## 2023-04-19 DIAGNOSIS — Z87.19 PERSONAL HISTORY OF OTHER DISEASES OF THE DIGESTIVE SYSTEM: ICD-10-CM

## 2023-04-19 DIAGNOSIS — Z87.39 PERSONAL HISTORY OF OTHER DISEASES OF THE MUSCULOSKELETAL SYSTEM AND CONNECTIVE TISSUE: ICD-10-CM

## 2023-04-19 DIAGNOSIS — S62.102A FRACTURE OF UNSPECIFIED CARPAL BONE, LEFT WRIST, INITIAL ENCOUNTER FOR CLOSED FRACTURE: ICD-10-CM

## 2023-04-19 DIAGNOSIS — Z88.5 ALLERGY STATUS TO NARCOTIC AGENT: ICD-10-CM

## 2023-04-19 DIAGNOSIS — Z98.89 OTHER SPECIFIED POSTPROCEDURAL STATES: Chronic | ICD-10-CM

## 2023-04-19 DIAGNOSIS — E78.5 HYPERLIPIDEMIA, UNSPECIFIED: ICD-10-CM

## 2023-04-19 DIAGNOSIS — S39.012A STRAIN OF MUSCLE, FASCIA AND TENDON OF LOWER BACK, INITIAL ENCOUNTER: ICD-10-CM

## 2023-04-19 DIAGNOSIS — M25.532 PAIN IN LEFT WRIST: ICD-10-CM

## 2023-04-19 DIAGNOSIS — M54.50 LOW BACK PAIN, UNSPECIFIED: ICD-10-CM
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
## 2023-04-19 DIAGNOSIS — Z87.42 PERSONAL HISTORY OF OTHER DISEASES OF THE FEMALE GENITAL TRACT: ICD-10-CM

## 2023-04-19 DIAGNOSIS — Z79.84 LONG TERM (CURRENT) USE OF ORAL HYPOGLYCEMIC DRUGS: ICD-10-CM

## 2023-04-19 DIAGNOSIS — Z98.84 BARIATRIC SURGERY STATUS: ICD-10-CM

## 2023-04-19 DIAGNOSIS — Z91.040 LATEX ALLERGY STATUS: ICD-10-CM

## 2023-04-19 DIAGNOSIS — Z98.84 BARIATRIC SURGERY STATUS: Chronic | ICD-10-CM

## 2023-04-19 DIAGNOSIS — E11.9 TYPE 2 DIABETES MELLITUS WITHOUT COMPLICATIONS: ICD-10-CM

## 2023-04-19 PROCEDURE — 99284 EMERGENCY DEPT VISIT MOD MDM: CPT
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
## 2023-04-20 VITALS
HEART RATE: 73 BPM | OXYGEN SATURATION: 99 % | RESPIRATION RATE: 18 BRPM | TEMPERATURE: 98 F | SYSTOLIC BLOOD PRESSURE: 131 MMHG | DIASTOLIC BLOOD PRESSURE: 81 MMHG

## 2023-04-20 PROCEDURE — 72100 X-RAY EXAM L-S SPINE 2/3 VWS: CPT | Mod: 26

## 2023-04-20 PROCEDURE — 73110 X-RAY EXAM OF WRIST: CPT | Mod: 26,LT

## 2023-04-20 PROCEDURE — 73090 X-RAY EXAM OF FOREARM: CPT | Mod: 26,LT

## 2023-04-20 RX ORDER — METHOCARBAMOL 500 MG/1
1000 TABLET, FILM COATED ORAL ONCE
Refills: 0 | Status: COMPLETED | OUTPATIENT
Start: 2023-04-20 | End: 2023-04-20

## 2023-04-20 RX ORDER — IBUPROFEN 200 MG
600 TABLET ORAL ONCE
Refills: 0 | Status: COMPLETED | OUTPATIENT
Start: 2023-04-20 | End: 2023-04-20

## 2023-04-20 RX ADMIN — Medication 600 MILLIGRAM(S): at 01:34

## 2023-04-20 RX ADMIN — METHOCARBAMOL 1000 MILLIGRAM(S): 500 TABLET, FILM COATED ORAL at 01:34
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
## 2023-04-20 NOTE — ED PROVIDER NOTE - PATIENT PORTAL LINK FT
You can access the FollowMyHealth Patient Portal offered by [facility] by registering at the following website: [email or web address]. By joining [employer]’s FollowMyHealth portal, you will also be able to view your health information using other applications (apps) compatible with our system.
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
## 2023-04-20 NOTE — ED PROVIDER NOTE - WET READ LAUNCH FT
There are no Wet Read(s) to document. Island Pedicle Flap Text: The defect edges were debeveled with a #15 scalpel blade.  Given the location of the defect, shape of the defect and the proximity to free margins an island pedicle advancement flap was deemed most appropriate.  Using a sterile surgical marker, an appropriate advancement flap was drawn incorporating the defect, outlining the appropriate donor tissue and placing the expected incisions within the relaxed skin tension lines where possible.    The area thus outlined was incised deep to adipose tissue with a #15 scalpel blade.  The skin margins were undermined to an appropriate distance in all directions around the primary defect and laterally outward around the island pedicle utilizing iris scissors.  There was minimal undermining beneath the pedicle flap.
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
## 2023-07-21 PROBLEM — Z13.21 ENCOUNTER FOR SPECIAL SCREENING EXAMINATION FOR NUTRITIONAL DISORDER: Status: ACTIVE | Noted: 2023-07-21

## 2023-07-21 PROBLEM — Z13.29 SCREENING FOR ENDOCRINE, NUTRITIONAL, METABOLIC AND IMMUNITY DISORDER: Status: ACTIVE | Noted: 2023-07-21

## 2023-07-27 ENCOUNTER — APPOINTMENT (OUTPATIENT)
Dept: SURGERY | Facility: CLINIC | Age: 58
End: 2023-07-27
Payer: COMMERCIAL

## 2023-07-27 VITALS
HEART RATE: 49 BPM | BODY MASS INDEX: 31.87 KG/M2 | OXYGEN SATURATION: 99 % | TEMPERATURE: 98 F | HEIGHT: 68 IN | WEIGHT: 210.31 LBS | RESPIRATION RATE: 17 BRPM | SYSTOLIC BLOOD PRESSURE: 138 MMHG | DIASTOLIC BLOOD PRESSURE: 84 MMHG

## 2023-07-27 DIAGNOSIS — Z13.228 ENCOUNTER FOR SCREENING FOR OTHER SUSPECTED ENDOCRINE DISORDER: ICD-10-CM

## 2023-07-27 DIAGNOSIS — Z13.0 ENCOUNTER FOR SCREENING FOR OTHER SUSPECTED ENDOCRINE DISORDER: ICD-10-CM

## 2023-07-27 DIAGNOSIS — Z13.29 ENCOUNTER FOR SCREENING FOR OTHER SUSPECTED ENDOCRINE DISORDER: ICD-10-CM

## 2023-07-27 DIAGNOSIS — Z13.21 ENCOUNTER FOR SCREENING FOR OTHER SUSPECTED ENDOCRINE DISORDER: ICD-10-CM

## 2023-07-27 DIAGNOSIS — R63.5 ABNORMAL WEIGHT GAIN: ICD-10-CM

## 2023-07-27 DIAGNOSIS — Z87.828 PERSONAL HISTORY OF OTHER (HEALED) PHYSICAL INJURY AND TRAUMA: ICD-10-CM

## 2023-07-27 DIAGNOSIS — S99.912A UNSPECIFIED INJURY OF LEFT ANKLE, INITIAL ENCOUNTER: ICD-10-CM

## 2023-07-27 DIAGNOSIS — Z13.21 ENCOUNTER FOR SCREENING FOR NUTRITIONAL DISORDER: ICD-10-CM

## 2023-07-27 PROCEDURE — 99212 OFFICE O/P EST SF 10 MIN: CPT

## 2023-07-27 NOTE — REASON FOR VISIT
[Follow-Up Visit] : a follow-up visit for [S/P Bariatric Surgery] : s/p bariatric surgery [FreeTextEntry2] : DOLV: 6/5/2015

## 2023-07-27 NOTE — ASSESSMENT
[FreeTextEntry1] : Previous visit weight: 174\par Today's weight: 210 \par Today's BMI: 32\par \par Resume high-protein low-carb diet\par Dietary counseling provided- follow up with Edie Haas \par Increase activities as tolerated\par Check blood work \par Check UGI to evaluate anatomy\par Continue vitamins \par Follow up after testing completed \par

## 2023-07-27 NOTE — HISTORY OF PRESENT ILLNESS
[Procedure: ___] : Procedure performed: [unfilled]  [Date of Surgery: ___] : Date of Surgery:   [unfilled] [Surgeon Name:   ___] : Surgeon Name: Dr. FITZGERALD [Pre-Op Weight ___] : Pre-op weight was [unfilled] lbs [___ Years Post Op] : [unfilled] years [de-identified] : Patient lost to follow up, here today to re-establish care with bariatric office. 32# less than preop weight. Currently on Wegovy, but does not want to be on this medication lifelong \par Having GERD symptoms & would like to discuss her surgical options

## 2023-08-02 ENCOUNTER — OUTPATIENT (OUTPATIENT)
Dept: OUTPATIENT SERVICES | Facility: HOSPITAL | Age: 58
LOS: 1 days | End: 2023-08-02
Payer: COMMERCIAL

## 2023-08-02 ENCOUNTER — APPOINTMENT (OUTPATIENT)
Dept: RADIOLOGY | Facility: HOSPITAL | Age: 58
End: 2023-08-02
Payer: COMMERCIAL

## 2023-08-02 DIAGNOSIS — Z98.84 BARIATRIC SURGERY STATUS: Chronic | ICD-10-CM

## 2023-08-02 DIAGNOSIS — Z98.89 OTHER SPECIFIED POSTPROCEDURAL STATES: Chronic | ICD-10-CM

## 2023-08-02 DIAGNOSIS — R63.5 ABNORMAL WEIGHT GAIN: ICD-10-CM

## 2023-08-02 DIAGNOSIS — Z90.3 ACQUIRED ABSENCE OF STOMACH [PART OF]: ICD-10-CM

## 2023-08-02 DIAGNOSIS — Z13.21 ENCOUNTER FOR SCREENING FOR NUTRITIONAL DISORDER: ICD-10-CM

## 2023-08-02 DIAGNOSIS — Z13.29 ENCOUNTER FOR SCREENING FOR OTHER SUSPECTED ENDOCRINE DISORDER: ICD-10-CM

## 2023-08-02 DIAGNOSIS — Z86.39 PERSONAL HISTORY OF OTHER ENDOCRINE, NUTRITIONAL AND METABOLIC DISEASE: ICD-10-CM

## 2023-08-02 DIAGNOSIS — D25.9 LEIOMYOMA OF UTERUS, UNSPECIFIED: Chronic | ICD-10-CM

## 2023-08-02 LAB
25(OH)D3 SERPL-MCNC: 37.8 NG/ML
ALBUMIN SERPL ELPH-MCNC: 4.5 G/DL
ALP BLD-CCNC: 74 U/L
ALT SERPL-CCNC: 19 U/L
ANION GAP SERPL CALC-SCNC: 13 MMOL/L
AST SERPL-CCNC: 18 U/L
BILIRUB SERPL-MCNC: 0.8 MG/DL
BUN SERPL-MCNC: 12 MG/DL
CALCIUM SERPL-MCNC: 10.1 MG/DL
CHLORIDE SERPL-SCNC: 104 MMOL/L
CO2 SERPL-SCNC: 26 MMOL/L
CREAT SERPL-MCNC: 0.55 MG/DL
EGFR: 107 ML/MIN/1.73M2
ESTIMATED AVERAGE GLUCOSE: 126 MG/DL
FOLATE SERPL-MCNC: 13.4 NG/ML
GLUCOSE SERPL-MCNC: 119 MG/DL
HBA1C MFR BLD HPLC: 6 %
IRON SERPL-MCNC: 82 UG/DL
POTASSIUM SERPL-SCNC: 4.6 MMOL/L
PROT SERPL-MCNC: 7.2 G/DL
SODIUM SERPL-SCNC: 142 MMOL/L
VIT B12 SERPL-MCNC: 1667 PG/ML

## 2023-08-02 PROCEDURE — 74240 X-RAY XM UPR GI TRC 1CNTRST: CPT | Mod: 26

## 2023-08-02 PROCEDURE — 74240 X-RAY XM UPR GI TRC 1CNTRST: CPT

## 2023-08-04 ENCOUNTER — NON-APPOINTMENT (OUTPATIENT)
Age: 58
End: 2023-08-04

## 2023-08-07 ENCOUNTER — NON-APPOINTMENT (OUTPATIENT)
Age: 58
End: 2023-08-07

## 2023-08-09 ENCOUNTER — APPOINTMENT (OUTPATIENT)
Dept: GASTROENTEROLOGY | Facility: CLINIC | Age: 58
End: 2023-08-09
Payer: COMMERCIAL

## 2023-08-09 DIAGNOSIS — R93.3 ABNORMAL FINDINGS ON DIAGNOSTIC IMAGING OF OTHER PARTS OF DIGESTIVE TRACT: ICD-10-CM

## 2023-08-09 LAB
VIT B1 SERPL-MCNC: 104.9 NMOL/L
VIT B6 SERPL-MCNC: 15 UG/L
ZINC SERPL-MCNC: 97 UG/DL

## 2023-08-09 PROCEDURE — 99204 OFFICE O/P NEW MOD 45 MIN: CPT | Mod: 95

## 2023-08-09 NOTE — ASSESSMENT
[FreeTextEntry1] : 58-year-old female with history of gastric sleeve, With reflux symptoms, abnormal upper GI series.  Likely patient will need conversion to Aurelia-en-Y gastric bypass given her upper GI series demonstrating reflux at the level of the clavicles.  Recommend that she undergo esophageal manometry plus EGD/EndoFlip/Bravo pH capsule off of PPI to her degree of reflux as she is also interested in potential revision of her sleeve and not necessarily a Aurelia-en-Y gastric bypass. 1.  Esophageal manometry The risks, benefits and alternatives of the procedure were discussed with the patient in detail. Risks include nasal irritation, bleeding, coughing, sore throat and sinusitis. All questions were answered. The patient expressed understanding of these risks and is agreeable to proceed.   2.  EGD/EndoFlip/Bravo off of antacids The risks, benefits and alternatives of the procedure were discussed with the patient in detail. Risks include bleeding, infection, bowel perforation, dysphagia and chest discomfort from the BRAVO capsule. The patient was also instructed that an MRI is not permitted within 30 days after BRAVO is placed, unless there is confirmed expulsion of the capsule. All questions were answered. The patient expressed understanding of these risks and is agreeable to proceed. I instructed the patient to discontinue his/her PPI x 7 days prior to procedure.

## 2023-08-09 NOTE — CONSULT LETTER
[Dear  ___] : Dear  [unfilled], [Consult Letter:] : I had the pleasure of evaluating your patient, [unfilled]. [Please see my note below.] : Please see my note below. [Consult Closing:] : Thank you very much for allowing me to participate in the care of this patient.  If you have any questions, please do not hesitate to contact me. [Sincerely,] : Sincerely, [FreeTextEntry2] : Dr. Dillon Galvez [FreeTextEntry3] : Fabiana John MD Director of GI Motility, [facility]  of Medicine Division of Gastroenterology [facility] at [address], [address] [place] [address] Tel: [phone] Motility Appts-Motility Coordinator: Gaurav Momo: [phone]

## 2023-08-09 NOTE — HISTORY OF PRESENT ILLNESS
[Home] : at home, [unfilled] , at the time of the visit. [Medical Office: ([facility])___] : at the medical office located in  [Verbal consent obtained from patient] : the patient, [unfilled] [FreeTextEntry1] : 58-year-old pleasant female with history of obesity status post gastric sleeve in 2014 here for a consultation for abnormal upper GI series, requested by Dr. Galvez.  Patient states that prior to sleeve surgery, she was diagnosed with esophagitis by her gastroenterologist.  Post sleeve, her reflux has significantly worsened over the past 9 years.  She feels like food is sitting at the bottom of her breastbone.  She notices that if she eats at night despite waiting hours she will have regurgitation of Liquid contents.  She does take omeprazole daily, occasionally twice daily.  She has had episodes in which she has thrown up in her sleep and will wake up.  She denies any dysphagia/odynophagia/RUQ pain She has noticed decreased appetite since starting wegovy and sensation of feeling full she had diarrhea x 2 weeks with initiation of GLP-1 agonist, which has subsided.  She is currently taking Wegovy to stimulate weight loss, this was started in July 2023. Last colonoscopy was in 2019 ([employer] [facility]) Last EGD was in 2019  She is open to potential conversion to nataly en  bypass  GI series demonstrates tertiary contractions noted in the esophagus.  Mild irregularity along the wall of the esophagus which may be related to the contractions, may also reflect esophagitis.  The esophagus is mildly dilated.  There is a small hiatal hernia, narrowing at the GE junction during portions of the study which expands and is likely secondary to contractions involving the lower esophagus as opposed to a Schatzki's ring.  Gastroesophageal reflux is seen in the study which extended superior to the level of the clavicle

## 2023-08-17 ENCOUNTER — APPOINTMENT (OUTPATIENT)
Dept: SURGERY | Facility: CLINIC | Age: 58
End: 2023-08-17

## 2023-09-26 ENCOUNTER — APPOINTMENT (OUTPATIENT)
Dept: GASTROENTEROLOGY | Facility: HOSPITAL | Age: 58
End: 2023-09-26
Payer: COMMERCIAL

## 2023-09-26 ENCOUNTER — RESULT REVIEW (OUTPATIENT)
Age: 58
End: 2023-09-26

## 2023-09-26 ENCOUNTER — OUTPATIENT (OUTPATIENT)
Dept: OUTPATIENT SERVICES | Facility: HOSPITAL | Age: 58
LOS: 1 days | Discharge: HOME | End: 2023-09-26
Payer: COMMERCIAL

## 2023-09-26 VITALS
RESPIRATION RATE: 16 BRPM | OXYGEN SATURATION: 100 % | HEIGHT: 68 IN | DIASTOLIC BLOOD PRESSURE: 69 MMHG | TEMPERATURE: 98 F | SYSTOLIC BLOOD PRESSURE: 133 MMHG | WEIGHT: 199.96 LBS | HEART RATE: 57 BPM

## 2023-09-26 VITALS
RESPIRATION RATE: 18 BRPM | SYSTOLIC BLOOD PRESSURE: 134 MMHG | HEART RATE: 60 BPM | OXYGEN SATURATION: 98 % | DIASTOLIC BLOOD PRESSURE: 70 MMHG

## 2023-09-26 DIAGNOSIS — Z98.89 OTHER SPECIFIED POSTPROCEDURAL STATES: Chronic | ICD-10-CM

## 2023-09-26 DIAGNOSIS — D25.9 LEIOMYOMA OF UTERUS, UNSPECIFIED: Chronic | ICD-10-CM

## 2023-09-26 DIAGNOSIS — R93.3 ABNORMAL FINDINGS ON DIAGNOSTIC IMAGING OF OTHER PARTS OF DIGESTIVE TRACT: ICD-10-CM

## 2023-09-26 DIAGNOSIS — Z98.84 BARIATRIC SURGERY STATUS: Chronic | ICD-10-CM

## 2023-09-26 LAB — GLUCOSE BLDC GLUCOMTR-MCNC: 74 MG/DL — SIGNIFICANT CHANGE UP (ref 70–99)

## 2023-09-26 PROCEDURE — 43239 EGD BIOPSY SINGLE/MULTIPLE: CPT

## 2023-09-26 PROCEDURE — 91010 ESOPHAGUS MOTILITY STUDY: CPT | Mod: 26

## 2023-09-26 PROCEDURE — 91037 ESOPH IMPED FUNCTION TEST: CPT | Mod: 26

## 2023-09-26 PROCEDURE — 91013 ESOPHGL MOTIL W/STIM/PERFUS: CPT | Mod: 26

## 2023-09-26 DEVICE — IMPLANTABLE DEVICE: Type: IMPLANTABLE DEVICE | Status: FUNCTIONAL

## 2023-09-26 RX ORDER — FAMOTIDINE 10 MG/ML
1 INJECTION INTRAVENOUS
Qty: 0 | Refills: 0 | DISCHARGE

## 2023-09-26 NOTE — ASU PATIENT PROFILE, ADULT - FALL HARM RISK - UNIVERSAL INTERVENTIONS
Bed in lowest position, wheels locked, appropriate side rails in place/Call bell, personal items and telephone in reach/Instruct patient to call for assistance before getting out of bed or chair/Non-slip footwear when patient is out of bed/[place] to call system/Physically safe environment - no spills, clutter or unnecessary equipment/Purposeful Proactive Rounding/Room/bathroom lighting operational, light cord in reach

## 2023-09-26 NOTE — ASU PATIENT PROFILE, ADULT - VISION (WITH CORRECTIVE LENSES IF THE PATIENT USUALLY WEARS THEM):
Hemoglobin of 5 from admission.  Transfusing blood.  Continue to monitor, transfuse for hemoglobin of less than or equal to 7   We will check iron studies, consider parenteral iron according to results   Normal vision: sees adequately in most situations; can see medication labels, newsprint

## 2023-09-26 NOTE — ASU PREOP CHECKLIST - ASSESSMENT, HISTORY & PHYSICAL COMPLETED AND ON MEDICAL RECORD
done
[FreeTextEntry1] : 13-month-old male with history of perioral urticaria and vomiting after peanut exposure presented for supervised oral food challenge to peanut, which he passed without any adverse reactions. It is unclear if initial reaction was contact urticaria with coinciding vomiting/spit up vs Luke has resolved IgE-mediated allergy to peanut. \par \par Recommend reintroduction of peanut into Luke's diet with regular consumption (at least twice a week). Discussed age appropriate forms of peanuts. Also, recommend timely introduction of tree nuts. \par \par Follow up in 3 months for eczema and to ensure peanut has been regularly introduced into Luke's diet.

## 2023-09-29 LAB — SURGICAL PATHOLOGY STUDY: SIGNIFICANT CHANGE UP

## 2023-09-29 PROCEDURE — 91035 G-ESOPH REFLX TST W/ELECTROD: CPT | Mod: 26

## 2023-10-26 ENCOUNTER — APPOINTMENT (OUTPATIENT)
Dept: SURGERY | Facility: CLINIC | Age: 58
End: 2023-10-26
Payer: COMMERCIAL

## 2023-10-26 VITALS
HEART RATE: 63 BPM | WEIGHT: 206.31 LBS | RESPIRATION RATE: 17 BRPM | TEMPERATURE: 97 F | BODY MASS INDEX: 31.27 KG/M2 | DIASTOLIC BLOOD PRESSURE: 78 MMHG | OXYGEN SATURATION: 99 % | SYSTOLIC BLOOD PRESSURE: 133 MMHG | HEIGHT: 68 IN

## 2023-10-26 DIAGNOSIS — K21.00 GASTRO-ESOPHAGEAL REFLUX DISEASE WITH ESOPHAGITIS, WITHOUT BLEEDING: ICD-10-CM

## 2023-10-26 DIAGNOSIS — Z90.3 ACQUIRED ABSENCE OF STOMACH [PART OF]: ICD-10-CM

## 2023-10-26 PROCEDURE — 99215 OFFICE O/P EST HI 40 MIN: CPT

## 2023-11-02 ENCOUNTER — OUTPATIENT (OUTPATIENT)
Dept: OUTPATIENT SERVICES | Facility: HOSPITAL | Age: 58
LOS: 1 days | End: 2023-11-02
Payer: COMMERCIAL

## 2023-11-02 VITALS
HEIGHT: 68 IN | SYSTOLIC BLOOD PRESSURE: 144 MMHG | WEIGHT: 210.1 LBS | RESPIRATION RATE: 12 BRPM | TEMPERATURE: 98 F | OXYGEN SATURATION: 99 % | HEART RATE: 62 BPM | DIASTOLIC BLOOD PRESSURE: 84 MMHG

## 2023-11-02 DIAGNOSIS — D25.9 LEIOMYOMA OF UTERUS, UNSPECIFIED: Chronic | ICD-10-CM

## 2023-11-02 DIAGNOSIS — K21.00 GASTRO-ESOPHAGEAL REFLUX DISEASE WITH ESOPHAGITIS, WITHOUT BLEEDING: ICD-10-CM

## 2023-11-02 DIAGNOSIS — Z29.9 ENCOUNTER FOR PROPHYLACTIC MEASURES, UNSPECIFIED: ICD-10-CM

## 2023-11-02 DIAGNOSIS — G47.33 OBSTRUCTIVE SLEEP APNEA (ADULT) (PEDIATRIC): ICD-10-CM

## 2023-11-02 DIAGNOSIS — Z98.84 BARIATRIC SURGERY STATUS: Chronic | ICD-10-CM

## 2023-11-02 DIAGNOSIS — Z98.89 OTHER SPECIFIED POSTPROCEDURAL STATES: Chronic | ICD-10-CM

## 2023-11-02 DIAGNOSIS — Z01.818 ENCOUNTER FOR OTHER PREPROCEDURAL EXAMINATION: ICD-10-CM

## 2023-11-02 DIAGNOSIS — E11.9 TYPE 2 DIABETES MELLITUS WITHOUT COMPLICATIONS: ICD-10-CM

## 2023-11-02 LAB
A1C WITH ESTIMATED AVERAGE GLUCOSE RESULT: 5.2 % — SIGNIFICANT CHANGE UP (ref 4–5.6)
A1C WITH ESTIMATED AVERAGE GLUCOSE RESULT: 5.2 % — SIGNIFICANT CHANGE UP (ref 4–5.6)
ALBUMIN SERPL ELPH-MCNC: 4.1 G/DL — SIGNIFICANT CHANGE UP (ref 3.3–5)
ALBUMIN SERPL ELPH-MCNC: 4.1 G/DL — SIGNIFICANT CHANGE UP (ref 3.3–5)
ALP SERPL-CCNC: 66 U/L — SIGNIFICANT CHANGE UP (ref 40–120)
ALP SERPL-CCNC: 66 U/L — SIGNIFICANT CHANGE UP (ref 40–120)
ALT FLD-CCNC: 16 U/L — SIGNIFICANT CHANGE UP (ref 10–45)
ALT FLD-CCNC: 16 U/L — SIGNIFICANT CHANGE UP (ref 10–45)
ANION GAP SERPL CALC-SCNC: 10 MMOL/L — SIGNIFICANT CHANGE UP (ref 5–17)
ANION GAP SERPL CALC-SCNC: 10 MMOL/L — SIGNIFICANT CHANGE UP (ref 5–17)
AST SERPL-CCNC: 14 U/L — SIGNIFICANT CHANGE UP (ref 10–40)
AST SERPL-CCNC: 14 U/L — SIGNIFICANT CHANGE UP (ref 10–40)
BILIRUB SERPL-MCNC: 0.7 MG/DL — SIGNIFICANT CHANGE UP (ref 0.2–1.2)
BILIRUB SERPL-MCNC: 0.7 MG/DL — SIGNIFICANT CHANGE UP (ref 0.2–1.2)
BLD GP AB SCN SERPL QL: NEGATIVE — SIGNIFICANT CHANGE UP
BLD GP AB SCN SERPL QL: NEGATIVE — SIGNIFICANT CHANGE UP
BUN SERPL-MCNC: 13 MG/DL — SIGNIFICANT CHANGE UP (ref 7–23)
BUN SERPL-MCNC: 13 MG/DL — SIGNIFICANT CHANGE UP (ref 7–23)
CALCIUM SERPL-MCNC: 9.9 MG/DL — SIGNIFICANT CHANGE UP (ref 8.4–10.5)
CALCIUM SERPL-MCNC: 9.9 MG/DL — SIGNIFICANT CHANGE UP (ref 8.4–10.5)
CHLORIDE SERPL-SCNC: 106 MMOL/L — SIGNIFICANT CHANGE UP (ref 96–108)
CHLORIDE SERPL-SCNC: 106 MMOL/L — SIGNIFICANT CHANGE UP (ref 96–108)
CO2 SERPL-SCNC: 25 MMOL/L — SIGNIFICANT CHANGE UP (ref 22–31)
CO2 SERPL-SCNC: 25 MMOL/L — SIGNIFICANT CHANGE UP (ref 22–31)
CREAT SERPL-MCNC: 0.59 MG/DL — SIGNIFICANT CHANGE UP (ref 0.5–1.3)
CREAT SERPL-MCNC: 0.59 MG/DL — SIGNIFICANT CHANGE UP (ref 0.5–1.3)
EGFR: 105 ML/MIN/1.73M2 — SIGNIFICANT CHANGE UP
EGFR: 105 ML/MIN/1.73M2 — SIGNIFICANT CHANGE UP
ESTIMATED AVERAGE GLUCOSE: 103 MG/DL — SIGNIFICANT CHANGE UP (ref 68–114)
ESTIMATED AVERAGE GLUCOSE: 103 MG/DL — SIGNIFICANT CHANGE UP (ref 68–114)
GLUCOSE SERPL-MCNC: 85 MG/DL — SIGNIFICANT CHANGE UP (ref 70–99)
GLUCOSE SERPL-MCNC: 85 MG/DL — SIGNIFICANT CHANGE UP (ref 70–99)
HCT VFR BLD CALC: 38 % — SIGNIFICANT CHANGE UP (ref 34.5–45)
HCT VFR BLD CALC: 38 % — SIGNIFICANT CHANGE UP (ref 34.5–45)
HGB BLD-MCNC: 11.6 G/DL — SIGNIFICANT CHANGE UP (ref 11.5–15.5)
HGB BLD-MCNC: 11.6 G/DL — SIGNIFICANT CHANGE UP (ref 11.5–15.5)
MCHC RBC-ENTMCNC: 27 PG — SIGNIFICANT CHANGE UP (ref 27–34)
MCHC RBC-ENTMCNC: 27 PG — SIGNIFICANT CHANGE UP (ref 27–34)
MCHC RBC-ENTMCNC: 30.5 GM/DL — LOW (ref 32–36)
MCHC RBC-ENTMCNC: 30.5 GM/DL — LOW (ref 32–36)
MCV RBC AUTO: 88.4 FL — SIGNIFICANT CHANGE UP (ref 80–100)
MCV RBC AUTO: 88.4 FL — SIGNIFICANT CHANGE UP (ref 80–100)
NRBC # BLD: 0 /100 WBCS — SIGNIFICANT CHANGE UP (ref 0–0)
NRBC # BLD: 0 /100 WBCS — SIGNIFICANT CHANGE UP (ref 0–0)
PLATELET # BLD AUTO: 318 K/UL — SIGNIFICANT CHANGE UP (ref 150–400)
PLATELET # BLD AUTO: 318 K/UL — SIGNIFICANT CHANGE UP (ref 150–400)
POTASSIUM SERPL-MCNC: 3.9 MMOL/L — SIGNIFICANT CHANGE UP (ref 3.5–5.3)
POTASSIUM SERPL-MCNC: 3.9 MMOL/L — SIGNIFICANT CHANGE UP (ref 3.5–5.3)
POTASSIUM SERPL-SCNC: 3.9 MMOL/L — SIGNIFICANT CHANGE UP (ref 3.5–5.3)
POTASSIUM SERPL-SCNC: 3.9 MMOL/L — SIGNIFICANT CHANGE UP (ref 3.5–5.3)
PROT SERPL-MCNC: 7.2 G/DL — SIGNIFICANT CHANGE UP (ref 6–8.3)
PROT SERPL-MCNC: 7.2 G/DL — SIGNIFICANT CHANGE UP (ref 6–8.3)
RBC # BLD: 4.3 M/UL — SIGNIFICANT CHANGE UP (ref 3.8–5.2)
RBC # BLD: 4.3 M/UL — SIGNIFICANT CHANGE UP (ref 3.8–5.2)
RBC # FLD: 13.5 % — SIGNIFICANT CHANGE UP (ref 10.3–14.5)
RBC # FLD: 13.5 % — SIGNIFICANT CHANGE UP (ref 10.3–14.5)
RH IG SCN BLD-IMP: POSITIVE — SIGNIFICANT CHANGE UP
RH IG SCN BLD-IMP: POSITIVE — SIGNIFICANT CHANGE UP
SODIUM SERPL-SCNC: 141 MMOL/L — SIGNIFICANT CHANGE UP (ref 135–145)
SODIUM SERPL-SCNC: 141 MMOL/L — SIGNIFICANT CHANGE UP (ref 135–145)
WBC # BLD: 7.87 K/UL — SIGNIFICANT CHANGE UP (ref 3.8–10.5)
WBC # BLD: 7.87 K/UL — SIGNIFICANT CHANGE UP (ref 3.8–10.5)
WBC # FLD AUTO: 7.87 K/UL — SIGNIFICANT CHANGE UP (ref 3.8–10.5)
WBC # FLD AUTO: 7.87 K/UL — SIGNIFICANT CHANGE UP (ref 3.8–10.5)

## 2023-11-02 PROCEDURE — 83036 HEMOGLOBIN GLYCOSYLATED A1C: CPT

## 2023-11-02 PROCEDURE — 86900 BLOOD TYPING SEROLOGIC ABO: CPT

## 2023-11-02 PROCEDURE — 85027 COMPLETE CBC AUTOMATED: CPT

## 2023-11-02 PROCEDURE — G0463: CPT

## 2023-11-02 PROCEDURE — 86901 BLOOD TYPING SEROLOGIC RH(D): CPT

## 2023-11-02 PROCEDURE — 80053 COMPREHEN METABOLIC PANEL: CPT

## 2023-11-02 PROCEDURE — 86850 RBC ANTIBODY SCREEN: CPT

## 2023-11-02 NOTE — H&P PST ADULT - PROBLEM SELECTOR PLAN 2
She is instructed to hold Wegovy for 4 weeks prior to surgery, and she states her last dose was 10/23/23.

## 2023-11-02 NOTE — H&P PST ADULT - HISTORY OF PRESENT ILLNESS
58 year old female with PMH of laparoscopic vertical sleeve gastrectomy 4/2014, GERD, ineffective esophageal motility, hiatal hernia, NIDDM. Patient states that she has been having severe GERD, and underwent endoscopy which showed that she is having esophagitis. She presents today to PST prior to scheduled Robot Aurelia En Y Gastric Bypass on 11/20/23. Patient denies recent fever, chills, chest pain, SOB, palpitations, or recent exposure to COVID-19.

## 2023-11-02 NOTE — H&P PST ADULT - ASSESSMENT
Activity: Patient states she can walk up a flight of stairs and she was walk 1-2 blocks with no symptoms, but is limited by back and knee pain.     DASI: 6.05    Mallampati: 3    Dental: Denies dentures/loose teeth     LITI VTE 2.0 SCORE [CLOT updated 2019]    AGE RELATED RISK FACTORS                                                       MOBILITY RELATED FACTORS  [X ] Age 41-60 years                                            (1 Point)                    [ ] Bed rest                                                        (1 Point)  [ ] Age: 61-74 years                                           (2 Points)                  [ ] Plaster cast                                                   (2 Points)  [ ] Age= 75 years                                              (3 Points)                    [ ] Bed bound for more than 72 hours                 (2 Points)    DISEASE RELATED RISK FACTORS                                               GENDER SPECIFIC FACTORS  [ ] Edema in the lower extremities                       (1 Point)              [ ] Pregnancy                                                     (1 Point)  [ ] Varicose veins                                               (1 Point)                     [ ] Post-partum < 6 weeks                                   (1 Point)             [X ] BMI > 25 Kg/m2                                            (1 Point)                     [ ] Hormonal therapy  or oral contraception          (1 Point)                 [ ] Sepsis (in the previous month)                        (1 Point)               [ ] History of pregnancy complications                 (1 point)  [ ] Pneumonia or serious lung disease                                               [ ] Unexplained or recurrent                     (1 Point)           (in the previous month)                               (1 Point)  [ ] Abnormal pulmonary function test                     (1 Point)                 SURGERY RELATED RISK FACTORS  [ ] Acute myocardial infarction                              (1 Point)               [ ]  Section                                             (1 Point)  [ ] Congestive heart failure (in the previous month)  (1 Point)      [ ] Minor surgery                                                  (1 Point)   [ ] Inflammatory bowel disease                             (1 Point)               [ ] Arthroscopic surgery                                        (2 Points)  [ ] Central venous access                                      (2 Points)                [X ] General surgery lasting more than 45 minutes (2 points)  [ ] Malignancy- Present or previous                   (2 Points)                [ ] Elective arthroplasty                                         (5 points)    [ ] Stroke (in the previous month)                          (5 Points)                                                                                                                                                           HEMATOLOGY RELATED FACTORS                                                 TRAUMA RELATED RISK FACTORS  [ ] Prior episodes of VTE                                     (3 Points)                [ ] Fracture of the hip, pelvis, or leg                       (5 Points)  [ ] Positive family history for VTE                         (3 Points)             [ ] Acute spinal cord injury (in the previous month)  (5 Points)  [ ] Prothrombin 93372 A                                     (3 Points)               [ ] Paralysis  (less than 1 month)                             (5 Points)  [ ] Factor V Leiden                                             (3 Points)                  [ ] Multiple Trauma within 1 month                        (5 Points)  [ ] Lupus anticoagulants                                     (3 Points)                                                           [ ] Anticardiolipin antibodies                               (3 Points)                                                       [ ] High homocysteine in the blood                      (3 Points)                                             [ ] Other congenital or acquired thrombophilia      (3 Points)                                                [ ] Heparin induced thrombocytopenia                  (3 Points)                                     Total Score [    4      ]

## 2023-11-02 NOTE — H&P PST ADULT - PROBLEM SELECTOR PLAN 1
Robot Aurelia En Y Gastric Bypass on 11/20/23.  CBC, CMP, Type&screen, and HemA1c done today at [facility].   Pre op instructions, including chlorhexidine, provided and all questions answered.   Incentive spirometer given and instructions provided.  She will take Omeprazole on DOS with a small sip of water.

## 2023-11-19 ENCOUNTER — TRANSCRIPTION ENCOUNTER (OUTPATIENT)
Age: 58
End: 2023-11-19

## 2023-11-20 ENCOUNTER — TRANSCRIPTION ENCOUNTER (OUTPATIENT)
Age: 58
End: 2023-11-20

## 2023-11-20 ENCOUNTER — INPATIENT (INPATIENT)
Facility: HOSPITAL | Age: 58
LOS: 1 days | Discharge: HOME | DRG: 621 | End: 2023-11-22
Attending: SURGERY | Admitting: SURGERY
Payer: COMMERCIAL

## 2023-11-20 ENCOUNTER — APPOINTMENT (OUTPATIENT)
Dept: SURGERY | Facility: HOSPITAL | Age: 58
End: 2023-11-20
Payer: COMMERCIAL

## 2023-11-20 VITALS
OXYGEN SATURATION: 99 % | SYSTOLIC BLOOD PRESSURE: 130 MMHG | WEIGHT: 210.1 LBS | RESPIRATION RATE: 18 BRPM | DIASTOLIC BLOOD PRESSURE: 72 MMHG | TEMPERATURE: 98 F | HEART RATE: 63 BPM | HEIGHT: 67.99 IN

## 2023-11-20 DIAGNOSIS — Z98.84 BARIATRIC SURGERY STATUS: Chronic | ICD-10-CM

## 2023-11-20 DIAGNOSIS — Z98.89 OTHER SPECIFIED POSTPROCEDURAL STATES: Chronic | ICD-10-CM

## 2023-11-20 DIAGNOSIS — K21.00 GASTRO-ESOPHAGEAL REFLUX DISEASE WITH ESOPHAGITIS, WITHOUT BLEEDING: ICD-10-CM

## 2023-11-20 DIAGNOSIS — D25.9 LEIOMYOMA OF UTERUS, UNSPECIFIED: Chronic | ICD-10-CM

## 2023-11-20 LAB
ANION GAP SERPL CALC-SCNC: 16 MMOL/L — SIGNIFICANT CHANGE UP (ref 5–17)
ANION GAP SERPL CALC-SCNC: 16 MMOL/L — SIGNIFICANT CHANGE UP (ref 5–17)
BASOPHILS # BLD AUTO: 0.02 K/UL — SIGNIFICANT CHANGE UP (ref 0–0.2)
BASOPHILS # BLD AUTO: 0.02 K/UL — SIGNIFICANT CHANGE UP (ref 0–0.2)
BASOPHILS NFR BLD AUTO: 0.2 % — SIGNIFICANT CHANGE UP (ref 0–2)
BASOPHILS NFR BLD AUTO: 0.2 % — SIGNIFICANT CHANGE UP (ref 0–2)
BUN SERPL-MCNC: 7 MG/DL — SIGNIFICANT CHANGE UP (ref 7–23)
BUN SERPL-MCNC: 7 MG/DL — SIGNIFICANT CHANGE UP (ref 7–23)
CALCIUM SERPL-MCNC: 9.1 MG/DL — SIGNIFICANT CHANGE UP (ref 8.4–10.5)
CALCIUM SERPL-MCNC: 9.1 MG/DL — SIGNIFICANT CHANGE UP (ref 8.4–10.5)
CHLORIDE SERPL-SCNC: 102 MMOL/L — SIGNIFICANT CHANGE UP (ref 96–108)
CHLORIDE SERPL-SCNC: 102 MMOL/L — SIGNIFICANT CHANGE UP (ref 96–108)
CO2 SERPL-SCNC: 22 MMOL/L — SIGNIFICANT CHANGE UP (ref 22–31)
CO2 SERPL-SCNC: 22 MMOL/L — SIGNIFICANT CHANGE UP (ref 22–31)
CREAT SERPL-MCNC: 0.56 MG/DL — SIGNIFICANT CHANGE UP (ref 0.5–1.3)
CREAT SERPL-MCNC: 0.56 MG/DL — SIGNIFICANT CHANGE UP (ref 0.5–1.3)
EGFR: 106 ML/MIN/1.73M2 — SIGNIFICANT CHANGE UP
EGFR: 106 ML/MIN/1.73M2 — SIGNIFICANT CHANGE UP
EOSINOPHIL # BLD AUTO: 0 K/UL — SIGNIFICANT CHANGE UP (ref 0–0.5)
EOSINOPHIL # BLD AUTO: 0 K/UL — SIGNIFICANT CHANGE UP (ref 0–0.5)
EOSINOPHIL NFR BLD AUTO: 0 % — SIGNIFICANT CHANGE UP (ref 0–6)
EOSINOPHIL NFR BLD AUTO: 0 % — SIGNIFICANT CHANGE UP (ref 0–6)
GLUCOSE BLDC GLUCOMTR-MCNC: 159 MG/DL — HIGH (ref 70–99)
GLUCOSE BLDC GLUCOMTR-MCNC: 159 MG/DL — HIGH (ref 70–99)
GLUCOSE BLDC GLUCOMTR-MCNC: 87 MG/DL — SIGNIFICANT CHANGE UP (ref 70–99)
GLUCOSE BLDC GLUCOMTR-MCNC: 87 MG/DL — SIGNIFICANT CHANGE UP (ref 70–99)
GLUCOSE SERPL-MCNC: 178 MG/DL — HIGH (ref 70–99)
GLUCOSE SERPL-MCNC: 178 MG/DL — HIGH (ref 70–99)
HCT VFR BLD CALC: 37.2 % — SIGNIFICANT CHANGE UP (ref 34.5–45)
HCT VFR BLD CALC: 37.2 % — SIGNIFICANT CHANGE UP (ref 34.5–45)
HGB BLD-MCNC: 11.5 G/DL — SIGNIFICANT CHANGE UP (ref 11.5–15.5)
HGB BLD-MCNC: 11.5 G/DL — SIGNIFICANT CHANGE UP (ref 11.5–15.5)
IMM GRANULOCYTES NFR BLD AUTO: 0.5 % — SIGNIFICANT CHANGE UP (ref 0–0.9)
IMM GRANULOCYTES NFR BLD AUTO: 0.5 % — SIGNIFICANT CHANGE UP (ref 0–0.9)
LYMPHOCYTES # BLD AUTO: 0.86 K/UL — LOW (ref 1–3.3)
LYMPHOCYTES # BLD AUTO: 0.86 K/UL — LOW (ref 1–3.3)
LYMPHOCYTES # BLD AUTO: 7.8 % — LOW (ref 13–44)
LYMPHOCYTES # BLD AUTO: 7.8 % — LOW (ref 13–44)
MAGNESIUM SERPL-MCNC: 2.4 MG/DL — SIGNIFICANT CHANGE UP (ref 1.6–2.6)
MAGNESIUM SERPL-MCNC: 2.4 MG/DL — SIGNIFICANT CHANGE UP (ref 1.6–2.6)
MCHC RBC-ENTMCNC: 27.3 PG — SIGNIFICANT CHANGE UP (ref 27–34)
MCHC RBC-ENTMCNC: 27.3 PG — SIGNIFICANT CHANGE UP (ref 27–34)
MCHC RBC-ENTMCNC: 30.9 GM/DL — LOW (ref 32–36)
MCHC RBC-ENTMCNC: 30.9 GM/DL — LOW (ref 32–36)
MCV RBC AUTO: 88.4 FL — SIGNIFICANT CHANGE UP (ref 80–100)
MCV RBC AUTO: 88.4 FL — SIGNIFICANT CHANGE UP (ref 80–100)
MONOCYTES # BLD AUTO: 0.3 K/UL — SIGNIFICANT CHANGE UP (ref 0–0.9)
MONOCYTES # BLD AUTO: 0.3 K/UL — SIGNIFICANT CHANGE UP (ref 0–0.9)
MONOCYTES NFR BLD AUTO: 2.7 % — SIGNIFICANT CHANGE UP (ref 2–14)
MONOCYTES NFR BLD AUTO: 2.7 % — SIGNIFICANT CHANGE UP (ref 2–14)
NEUTROPHILS # BLD AUTO: 9.8 K/UL — HIGH (ref 1.8–7.4)
NEUTROPHILS # BLD AUTO: 9.8 K/UL — HIGH (ref 1.8–7.4)
NEUTROPHILS NFR BLD AUTO: 88.8 % — HIGH (ref 43–77)
NEUTROPHILS NFR BLD AUTO: 88.8 % — HIGH (ref 43–77)
NRBC # BLD: 0 /100 WBCS — SIGNIFICANT CHANGE UP (ref 0–0)
NRBC # BLD: 0 /100 WBCS — SIGNIFICANT CHANGE UP (ref 0–0)
PHOSPHATE SERPL-MCNC: 3.6 MG/DL — SIGNIFICANT CHANGE UP (ref 2.5–4.5)
PHOSPHATE SERPL-MCNC: 3.6 MG/DL — SIGNIFICANT CHANGE UP (ref 2.5–4.5)
PLATELET # BLD AUTO: 297 K/UL — SIGNIFICANT CHANGE UP (ref 150–400)
PLATELET # BLD AUTO: 297 K/UL — SIGNIFICANT CHANGE UP (ref 150–400)
POTASSIUM SERPL-MCNC: 3.4 MMOL/L — LOW (ref 3.5–5.3)
POTASSIUM SERPL-MCNC: 3.4 MMOL/L — LOW (ref 3.5–5.3)
POTASSIUM SERPL-SCNC: 3.4 MMOL/L — LOW (ref 3.5–5.3)
POTASSIUM SERPL-SCNC: 3.4 MMOL/L — LOW (ref 3.5–5.3)
RBC # BLD: 4.21 M/UL — SIGNIFICANT CHANGE UP (ref 3.8–5.2)
RBC # BLD: 4.21 M/UL — SIGNIFICANT CHANGE UP (ref 3.8–5.2)
RBC # FLD: 13.4 % — SIGNIFICANT CHANGE UP (ref 10.3–14.5)
RBC # FLD: 13.4 % — SIGNIFICANT CHANGE UP (ref 10.3–14.5)
SODIUM SERPL-SCNC: 140 MMOL/L — SIGNIFICANT CHANGE UP (ref 135–145)
SODIUM SERPL-SCNC: 140 MMOL/L — SIGNIFICANT CHANGE UP (ref 135–145)
WBC # BLD: 11.03 K/UL — HIGH (ref 3.8–10.5)
WBC # BLD: 11.03 K/UL — HIGH (ref 3.8–10.5)
WBC # FLD AUTO: 11.03 K/UL — HIGH (ref 3.8–10.5)
WBC # FLD AUTO: 11.03 K/UL — HIGH (ref 3.8–10.5)

## 2023-11-20 PROCEDURE — 43644 LAP GASTRIC BYPASS/ROUX-EN-Y: CPT

## 2023-11-20 PROCEDURE — S2900 ROBOTIC SURGICAL SYSTEM: CPT | Mod: NC

## 2023-11-20 PROCEDURE — 43281 LAP PARAESOPHAG HERN REPAIR: CPT

## 2023-11-20 DEVICE — STAPLER COVIDIEN TRI-STAPLE 60MM PURPLE RELOAD: Type: IMPLANTABLE DEVICE | Status: FUNCTIONAL

## 2023-11-20 DEVICE — STAPLER COVIDIEN TRI-STAPLE 60MM TAN RELOAD: Type: IMPLANTABLE DEVICE | Status: FUNCTIONAL

## 2023-11-20 DEVICE — STAPLER COVIDIEN TRI-STAPLE 30MM PURPLE RELOAD: Type: IMPLANTABLE DEVICE | Status: FUNCTIONAL

## 2023-11-20 DEVICE — STAPLER COVIDIEN TRI-STAPLE 45MM PURPLE RELOAD: Type: IMPLANTABLE DEVICE | Status: FUNCTIONAL

## 2023-11-20 RX ORDER — THIAMINE MONONITRATE (VIT B1) 100 MG
100 TABLET ORAL ONCE
Refills: 0 | Status: COMPLETED | OUTPATIENT
Start: 2023-11-20 | End: 2023-11-20

## 2023-11-20 RX ORDER — POTASSIUM CHLORIDE 20 MEQ
10 PACKET (EA) ORAL
Refills: 0 | Status: COMPLETED | OUTPATIENT
Start: 2023-11-20 | End: 2023-11-20

## 2023-11-20 RX ORDER — FOLIC ACID 0.8 MG
1 TABLET ORAL ONCE
Refills: 0 | Status: COMPLETED | OUTPATIENT
Start: 2023-11-20 | End: 2023-11-20

## 2023-11-20 RX ORDER — OXYCODONE HYDROCHLORIDE 5 MG/1
5 TABLET ORAL EVERY 6 HOURS
Refills: 0 | Status: DISCONTINUED | OUTPATIENT
Start: 2023-11-20 | End: 2023-11-22

## 2023-11-20 RX ORDER — LIDOCAINE HCL 20 MG/ML
0.2 VIAL (ML) INJECTION ONCE
Refills: 0 | Status: DISCONTINUED | OUTPATIENT
Start: 2023-11-20 | End: 2023-11-20

## 2023-11-20 RX ORDER — OXYCODONE HYDROCHLORIDE 5 MG/1
5 TABLET ORAL EVERY 6 HOURS
Refills: 0 | Status: DISCONTINUED | OUTPATIENT
Start: 2023-11-20 | End: 2023-11-20

## 2023-11-20 RX ORDER — ONDANSETRON 8 MG/1
4 TABLET, FILM COATED ORAL ONCE
Refills: 0 | Status: DISCONTINUED | OUTPATIENT
Start: 2023-11-20 | End: 2023-11-20

## 2023-11-20 RX ORDER — SODIUM CHLORIDE 9 MG/ML
1000 INJECTION, SOLUTION INTRAVENOUS
Refills: 0 | Status: DISCONTINUED | OUTPATIENT
Start: 2023-11-20 | End: 2023-11-22

## 2023-11-20 RX ORDER — ACETAMINOPHEN 500 MG
1000 TABLET ORAL ONCE
Refills: 0 | Status: COMPLETED | OUTPATIENT
Start: 2023-11-20 | End: 2023-11-20

## 2023-11-20 RX ORDER — ACETAMINOPHEN 500 MG
1000 TABLET ORAL ONCE
Refills: 0 | Status: COMPLETED | OUTPATIENT
Start: 2023-11-21 | End: 2023-11-21

## 2023-11-20 RX ORDER — SODIUM CHLORIDE 9 MG/ML
3 INJECTION INTRAMUSCULAR; INTRAVENOUS; SUBCUTANEOUS EVERY 8 HOURS
Refills: 0 | Status: DISCONTINUED | OUTPATIENT
Start: 2023-11-20 | End: 2023-11-20

## 2023-11-20 RX ORDER — ONDANSETRON 8 MG/1
4 TABLET, FILM COATED ORAL EVERY 6 HOURS
Refills: 0 | Status: DISCONTINUED | OUTPATIENT
Start: 2023-11-20 | End: 2023-11-22

## 2023-11-20 RX ORDER — FENTANYL CITRATE 50 UG/ML
25 INJECTION INTRAVENOUS
Refills: 0 | Status: DISCONTINUED | OUTPATIENT
Start: 2023-11-20 | End: 2023-11-20

## 2023-11-20 RX ORDER — CHLORHEXIDINE GLUCONATE 213 G/1000ML
1 SOLUTION TOPICAL ONCE
Refills: 0 | Status: DISCONTINUED | OUTPATIENT
Start: 2023-11-20 | End: 2023-11-20

## 2023-11-20 RX ORDER — CEFAZOLIN SODIUM 1 G
2000 VIAL (EA) INJECTION EVERY 8 HOURS
Refills: 0 | Status: COMPLETED | OUTPATIENT
Start: 2023-11-20 | End: 2023-11-20

## 2023-11-20 RX ORDER — KETOROLAC TROMETHAMINE 30 MG/ML
30 SYRINGE (ML) INJECTION EVERY 6 HOURS
Refills: 0 | Status: DISCONTINUED | OUTPATIENT
Start: 2023-11-20 | End: 2023-11-22

## 2023-11-20 RX ORDER — HEPARIN SODIUM 5000 [USP'U]/ML
5000 INJECTION INTRAVENOUS; SUBCUTANEOUS ONCE
Refills: 0 | Status: COMPLETED | OUTPATIENT
Start: 2023-11-20 | End: 2023-11-20

## 2023-11-20 RX ORDER — FOSAPREPITANT DIMEGLUMINE 150 MG/5ML
150 INJECTION, POWDER, LYOPHILIZED, FOR SOLUTION INTRAVENOUS ONCE
Refills: 0 | Status: COMPLETED | OUTPATIENT
Start: 2023-11-20 | End: 2023-11-20

## 2023-11-20 RX ORDER — SODIUM CHLORIDE 9 MG/ML
1000 INJECTION, SOLUTION INTRAVENOUS
Refills: 0 | Status: COMPLETED | OUTPATIENT
Start: 2023-11-20 | End: 2023-11-20

## 2023-11-20 RX ORDER — HEPARIN SODIUM 5000 [USP'U]/ML
5000 INJECTION INTRAVENOUS; SUBCUTANEOUS EVERY 8 HOURS
Refills: 0 | Status: DISCONTINUED | OUTPATIENT
Start: 2023-11-20 | End: 2023-11-22

## 2023-11-20 RX ORDER — PANTOPRAZOLE SODIUM 20 MG/1
40 TABLET, DELAYED RELEASE ORAL DAILY
Refills: 0 | Status: DISCONTINUED | OUTPATIENT
Start: 2023-11-20 | End: 2023-11-22

## 2023-11-20 RX ORDER — BENZOCAINE AND MENTHOL 5; 1 G/100ML; G/100ML
1 LIQUID ORAL ONCE
Refills: 0 | Status: COMPLETED | OUTPATIENT
Start: 2023-11-20 | End: 2023-11-20

## 2023-11-20 RX ORDER — CEFAZOLIN SODIUM 1 G
2000 VIAL (EA) INJECTION ONCE
Refills: 0 | Status: COMPLETED | OUTPATIENT
Start: 2023-11-20 | End: 2023-11-20

## 2023-11-20 RX ADMIN — OXYCODONE HYDROCHLORIDE 5 MILLIGRAM(S): 5 TABLET ORAL at 19:19

## 2023-11-20 RX ADMIN — Medication 400 MILLIGRAM(S): at 21:21

## 2023-11-20 RX ADMIN — FENTANYL CITRATE 25 MICROGRAM(S): 50 INJECTION INTRAVENOUS at 12:31

## 2023-11-20 RX ADMIN — Medication 30 MILLIGRAM(S): at 23:20

## 2023-11-20 RX ADMIN — ONDANSETRON 4 MILLIGRAM(S): 8 TABLET, FILM COATED ORAL at 11:44

## 2023-11-20 RX ADMIN — Medication 30 MILLIGRAM(S): at 23:02

## 2023-11-20 RX ADMIN — Medication 1000 MILLIGRAM(S): at 15:18

## 2023-11-20 RX ADMIN — FENTANYL CITRATE 25 MICROGRAM(S): 50 INJECTION INTRAVENOUS at 12:51

## 2023-11-20 RX ADMIN — Medication 30 MILLIGRAM(S): at 16:12

## 2023-11-20 RX ADMIN — Medication 1 MILLIGRAM(S): at 11:59

## 2023-11-20 RX ADMIN — Medication 100 MILLIEQUIVALENT(S): at 12:31

## 2023-11-20 RX ADMIN — Medication 100 MILLIGRAM(S): at 23:02

## 2023-11-20 RX ADMIN — SODIUM CHLORIDE 150 MILLILITER(S): 9 INJECTION, SOLUTION INTRAVENOUS at 11:34

## 2023-11-20 RX ADMIN — BENZOCAINE AND MENTHOL 1 LOZENGE: 5; 1 LIQUID ORAL at 21:20

## 2023-11-20 RX ADMIN — FENTANYL CITRATE 25 MICROGRAM(S): 50 INJECTION INTRAVENOUS at 12:11

## 2023-11-20 RX ADMIN — FENTANYL CITRATE 25 MICROGRAM(S): 50 INJECTION INTRAVENOUS at 11:24

## 2023-11-20 RX ADMIN — FOSAPREPITANT DIMEGLUMINE 300 MILLIGRAM(S): 150 INJECTION, POWDER, LYOPHILIZED, FOR SOLUTION INTRAVENOUS at 06:14

## 2023-11-20 RX ADMIN — HEPARIN SODIUM 5000 UNIT(S): 5000 INJECTION INTRAVENOUS; SUBCUTANEOUS at 23:03

## 2023-11-20 RX ADMIN — Medication 100 MILLIEQUIVALENT(S): at 13:43

## 2023-11-20 RX ADMIN — Medication 100 MILLIEQUIVALENT(S): at 14:45

## 2023-11-20 RX ADMIN — Medication 30 MILLIGRAM(S): at 15:57

## 2023-11-20 RX ADMIN — FENTANYL CITRATE 25 MICROGRAM(S): 50 INJECTION INTRAVENOUS at 12:36

## 2023-11-20 RX ADMIN — Medication 400 MILLIGRAM(S): at 15:15

## 2023-11-20 RX ADMIN — HEPARIN SODIUM 5000 UNIT(S): 5000 INJECTION INTRAVENOUS; SUBCUTANEOUS at 15:57

## 2023-11-20 RX ADMIN — OXYCODONE HYDROCHLORIDE 5 MILLIGRAM(S): 5 TABLET ORAL at 18:49

## 2023-11-20 RX ADMIN — Medication 1000 MILLIGRAM(S): at 21:41

## 2023-11-20 RX ADMIN — Medication 100 MILLIGRAM(S): at 15:57

## 2023-11-20 RX ADMIN — HEPARIN SODIUM 5000 UNIT(S): 5000 INJECTION INTRAVENOUS; SUBCUTANEOUS at 06:15

## 2023-11-20 RX ADMIN — PANTOPRAZOLE SODIUM 40 MILLIGRAM(S): 20 TABLET, DELAYED RELEASE ORAL at 11:45

## 2023-11-20 RX ADMIN — ONDANSETRON 4 MILLIGRAM(S): 8 TABLET, FILM COATED ORAL at 23:03

## 2023-11-20 RX ADMIN — Medication 100 MILLIGRAM(S): at 11:44

## 2023-11-20 RX ADMIN — SODIUM CHLORIDE 250 MILLILITER(S): 9 INJECTION, SOLUTION INTRAVENOUS at 11:58

## 2023-11-20 RX ADMIN — FENTANYL CITRATE 25 MICROGRAM(S): 50 INJECTION INTRAVENOUS at 11:22

## 2023-11-20 NOTE — DISCHARGE NOTE PROVIDER - NSDCCPCAREPLAN_GEN_ALL_CORE_FT
PRINCIPAL DISCHARGE DIAGNOSIS  Diagnosis: Morbid obesity due to excess calories  Assessment and Plan of Treatment:       SECONDARY DISCHARGE DIAGNOSES  Diagnosis: Gastro-esophageal reflux disease with esophagitis, without bleeding  Assessment and Plan of Treatment:      PRINCIPAL DISCHARGE DIAGNOSIS  Diagnosis: Morbid obesity due to excess calories  Assessment and Plan of Treatment: You may shower; do not scrub your incisions (Notify us of redness/drainage from incisions). Pat dry abdomen. Leave the white steri strips in place; they will fall off on their own in approximately 5-7 days. Otherwise, your surgeon will remove them in the office. If any significant drainage, not color, odor, and amount, and wash with antibacterial soap and gently pat dry.   Bariatric phase 1 full liquid diet for 2 weeks, then thin puree for 4 weeks. Dietary supplement. No carbonated beverages, no soda, avoid straws. Drink at minimum 32 fl oz of water- sipped throughout the day. Please follow up with your dietitian within 30 days.  Analgesia as needed. Avoid NSAIDs (Ibuprofen, ASA, Naproxen, Aleve) as they increase risk of GI bleeding.  You may use Tylenol for mild pain otherwise contact prescriber for consult.  Do not swallow whole pills and capsules.   Use crushed, dissolvable, chewable, or liquid formulations of medications for 1 month.   Walk daily until you can walk 30 minutes at a time. Avoid heavy lifting or straining for 8 weeks. When you do lift, keep your back straight and bend at the knees allowing your legs to do most of the work. Do not start an exercise program until you talk with your doctor.   DVT prophylaxis with Lovenox SQ for 2 weeks  Female patients should not become pregnant for 18 months until weight loss has stabilized or injury could occur to the unborn child.   Do not allow a stomach tube (NGT) to be inserted for any reason without X-ray guidance!  Call office for nausea, vomiting, fever (101 degrees or higher), shortness of breath, chest pain, dizziness, weakness, calf pain and swelling, and for any other concerns you have. It is helpful to have the same follow up with YOUR surgeon who performed YOUR procedure.        SECONDARY DISCHARGE DIAGNOSES  Diagnosis: Gastro-esophageal reflux disease with esophagitis, without bleeding  Assessment and Plan of Treatment:

## 2023-11-20 NOTE — PACU DISCHARGE NOTE - NSPTMEETSDISCHCRITERIADT_GEN_A_CORE
20-Nov-2023 14:44 Adderall 20mg, 1 tab po bid,  Last ordered 11/24/21, 60 tabs, 0 refills    LOV: 5/11/21 for Establish care, multiple issues.  Has appointment with Camelia Murrell today.    PDMP reviewed. Adderall 20mg last dispensed 11/24/21 for 60 caps, a 30 day supply. She is well overdue for thi refill.

## 2023-11-20 NOTE — PRE-OP CHECKLIST - SPO2 (%)
----- Message from SYLVIA Mreaz sent at 8/20/2022  6:57 AM CDT -----  Please call with normal level TSH. Thank you    99

## 2023-11-20 NOTE — BRIEF OPERATIVE NOTE - NSICDXBRIEFPREOP_GEN_ALL_CORE_FT
PRE-OP DIAGNOSIS:  Hiatal hernia with GERD and esophagitis 20-Nov-2023 11:04:17  Luis Manuel Mccoy  Morbid obesity 20-Nov-2023 11:04:47  Luis Manuel Mccoy

## 2023-11-20 NOTE — BRIEF OPERATIVE NOTE - OPERATION/FINDINGS
Type 1 Hiatal hernia present, normal appearing sleeve anatomy.  Minimal adhesions. Robotic Aurelia en y gastric bypass with hiatal hernia repair performed

## 2023-11-20 NOTE — BRIEF OPERATIVE NOTE - NSICDXBRIEFPOSTOP_GEN_ALL_CORE_FT
POST-OP DIAGNOSIS:  Hiatal hernia with GERD and esophagitis 20-Nov-2023 11:04:24  Luis Manuel Mccoy  Morbid obesity 20-Nov-2023 11:04:29  Luis Manuel Mccoy

## 2023-11-20 NOTE — DISCHARGE NOTE PROVIDER - NSDCFUSCHEDAPPT_GEN_ALL_CORE_FT
Dillon Galvez  [facility] Physician [facility]  [address]  Scheduled Appointment: 12/07/2023    Dillon Galvez  [facility]  S[address]  Scheduled Appointment: 12/21/2023

## 2023-11-20 NOTE — DISCHARGE NOTE PROVIDER - NSDCMRMEDTOKEN_GEN_ALL_CORE_FT
omeprazole 40 mg oral delayed release capsule: 1 cap(s) orally 2 times a day  Wegovy (1.7 mg dose) subcutaneous solution: 1.7 milligram(s) subcutaneously once a week   acetaminophen 500 mg/15 mL oral liquid: 15 milliliter(s) orally every 6 hours as needed for -for pain  hyoscyamine 0.125 mg sublingual tablet: 1 tab(s) sublingual every 6 hours as needed for  gastric spasm MDD: 4  Lovenox 40 mg/0.4 mL injectable solution: 40 milligram(s) subcutaneously once a day VTE prophylaxis  omeprazole 40 mg oral delayed release capsule: 1 cap(s) orally once a day open capsule and mix content in applesauce MDD: 1  ondansetron 4 mg oral tablet, disintegratin tab(s) orally every 6 hours as needed for -for nausea   acetaminophen 500 mg/15 mL oral liquid: 15 milliliter(s) orally every 6 hours as needed for -for pain  hyoscyamine 0.125 mg sublingual tablet: 1 tab(s) sublingual every 6 hours as needed for  gastric spasm MDD: 4  Lovenox 40 mg/0.4 mL injectable solution: 40 milligram(s) subcutaneously once a day VTE prophylaxis  omeprazole 40 mg oral delayed release capsule: 1 cap(s) orally once a day open capsule and mix content in applesauce MDD: 1  ondansetron 4 mg oral tablet, disintegratin tab(s) orally every 6 hours as needed for -for nausea  simethicone 80 mg oral tablet, chewable: 1 tab(s) chewed every 8 hours as needed for  indigestion  sucralfate 1 g/10 mL oral suspension: 10 milliliter(s) orally 4 times a day Shake well

## 2023-11-20 NOTE — DISCHARGE NOTE PROVIDER - HOSPITAL COURSE
58 year old female with PMH of laparoscopic vertical sleeve gastrectomy 4/2014, GERD, ineffective esophageal motility, hiatal hernia, NIDDM. Patient states that she has been having severe GERD, and underwent endoscopy which showed that she is having esophagitis. She presents today to PST prior to scheduled Robot Aurelia En Y Gastric Bypass on 11/20/23. Patient denies recent fever, chills, chest pain, SOB, palpitations, or recent exposure to COVID-19.  (02 Nov 2023 15:27)    On 11/20/23 Ms. Martinez who has a history of ventral sleeve gastrectomy in 2014 and morbid obesity BMI 32, underwent Robot-assisted laparoscopic Aurelia-en-Y gastric bypass. Laparoscopic repair, hiatal hernia, paraesophageal. Bariatric ERAS protocol followed to include preoperative and perioperative use of DVT and SSI prophylaxis as well as multi-modal non-opioid analgesia. Patient tolerated the procedure well  extubated in the operating room then transferred to the PACU in stable condition. Once hemodynamically stable and effective pain control the patient was able to ambulate with assistance. Pt was also able to void within 4 hours following the procedure. The patient was later transferred to a bariatric unit and placed on bedside continuous pulse oximetry. Pain management included IV multi-modal non-opioid analgesia then transitioned to liquid as needed. The patient tolerated bariatric clear liquid the evening of surgery.    On POD #1 patient remained stable with no acute events overnight, has effective  pain control. Denies nausea and vomiting. Patient is ambulating independently and voiding as expected. The rest of the hospital course was uneventful, patient met [facility]-Point Bariatric Surgery D/C criteria and subsequently cleared for discharge home on POD#1. Prophylaxis dose of LMWH (40mg) x 14 days once daily. Patient educations with teach back.  ([facility] [place]TE Risk Stratification Risk  (% ). The patient will follow a protocol-derived staged meal progression supervised by the dietitian in the outpatient setting. Patient will follow-up with Dr. Galvez in 7-10 days, medical doctor and dietitian in 30 days. [facility] from [facility] prior to discharge. Written discharge instruction explained and given to include VTE prevention. 58 year old female with PMH of laparoscopic vertical sleeve gastrectomy 4/2014, GERD, ineffective esophageal motility, hiatal hernia, NIDDM.  Patient scheduled to undergo Robot Aurelia En Y Gastric Bypass.    On 11/20/23 Ms. Martinez who has a history of ventral sleeve gastrectomy in 2014 and morbid obesity BMI 32, underwent Robot-assisted laparoscopic Aurelia-en-Y gastric bypass. Laparoscopic repair, hiatal hernia, paraesophageal. Bariatric ERAS protocol followed to include preoperative and perioperative use of DVT and SSI prophylaxis as well as multi-modal non-opioid analgesia. Patient tolerated the procedure well  extubated in the operating room then transferred to the PACU in stable condition. Once hemodynamically stable and effective pain control the patient was able to ambulate with assistance. Pt was also able to void within 4 hours following the procedure. The patient was later transferred to a bariatric unit and placed on bedside continuous pulse oximetry. Pain management included IV multi-modal non-opioid analgesia then transitioned to liquid as needed. The patient tolerated bariatric clear liquid the evening of surgery.    On POD #1 patient remained stable with no acute events overnight, has effective  pain control. Denies nausea and vomiting. Patient is ambulating independently and voiding as expected. The rest of the hospital course was uneventful, patient met [facility] Bariatric [facility] D/C criteria and subsequently cleared for discharge home on POD#1. Prophylaxis dose of LMWH (40mg) x 14 days once daily. Patient education with teach back. The patient will follow a protocol-derived staged meal progression supervised by the dietitian in the outpatient setting. Patient will follow-up with Dr. Galvez in 7-10 days, medical doctor and dietitian in 30 days. All appropriate [facility] from [facility] prior to discharge. Written discharge instruction explained and given to include VTE prevention. 58 year old female with PMH of laparoscopic vertical sleeve gastrectomy 4/2014, GERD, ineffective esophageal motility, hiatal hernia, NIDDM.  Patient scheduled to undergo Robot Aurelia En Y Gastric Bypass.    On 11/20/23 Ms. Martinez who has a history of ventral sleeve gastrectomy in 2014 and morbid obesity BMI 32, underwent Robot-assisted laparoscopic Aurelia-en-Y gastric bypass. Laparoscopic repair, hiatal hernia, paraesophageal. Bariatric ERAS protocol followed to include preoperative and perioperative use of DVT and SSI prophylaxis as well as multi-modal non-opioid analgesia. Patient tolerated the procedure well  extubated in the operating room then transferred to the PACU in stable condition. Once hemodynamically stable and effective pain control the patient was able to ambulate with assistance. Pt was also able to void within 4 hours following the procedure. The patient was later transferred to a bariatric unit and placed on bedside continuous pulse oximetry. Pain management included IV multi-modal non-opioid analgesia then transitioned to liquid as needed. The patient tolerated bariatric clear liquid the evening of surgery.    On POD #1 patient remained stable with no acute events overnight, has effective pain control. Denies nausea and vomiting. Patient is ambulating independently and voiding as expected. Patient had some abdominal discomfort and stayed overnight for observation.    POD#2...     Patient met [facility]Point Bariatric [facility] D/C criteria and subsequently cleared for discharge home on POD#2. Prophylaxis dose of LMWH (40mg) x 14 days once daily. Patient education with teach back. The patient will follow a protocol-derived staged meal progression supervised by the dietitian in the outpatient setting. Patient will follow-up with Dr. Galvez in 7-10 days, medical doctor and dietitian in 30 days. All [facility] prior to discharge. Written discharge instruction explained and given to include VTE prevention. 58 year old female with PMH of laparoscopic vertical sleeve gastrectomy 4/2014, GERD, ineffective esophageal motility, hiatal hernia, NIDDM.  Patient scheduled to undergo Robot Aurelia En Y Gastric Bypass.    On 11/20/23 Ms. Martinez who has a history of ventral sleeve gastrectomy in 2014 and morbid obesity BMI 32, underwent Robot-assisted laparoscopic Aurelia-en-Y gastric bypass. Laparoscopic repair, hiatal hernia, paraesophageal. Bariatric ERAS protocol followed to include preoperative and perioperative use of DVT and SSI prophylaxis as well as multi-modal non-opioid analgesia. Patient tolerated the procedure well  extubated in the operating room then transferred to the PACU in stable condition. Once hemodynamically stable and effective pain control the patient was able to ambulate with assistance. Pt was also able to void within 4 hours following the procedure. The patient was later transferred to a bariatric unit and placed on bedside continuous pulse oximetry. Pain management included IV multi-modal non-opioid analgesia then transitioned to liquid as needed. The patient tolerated bariatric clear liquid the evening of surgery.    On POD #1 patient remained stable with no acute events overnight, has effective pain control. Denies nausea and vomiting. Patient is ambulating independently and voiding as expected. Patient had some abdominal discomfort and stayed overnight for observation.    On POD #2, patient reports improved abdominal discomfort with Carafate and Simethicone.     Patient met 8[facility]Point Bariatric Surgery D/C criteria and subsequently cleared for discharge home on POD#2. Prophylaxis dose of LMWH (40mg) x 14 days once daily. Patient education with teach back. The patient will follow a protocol-derived staged meal progression supervised by the dietitian in the outpatient setting. Patient will follow-up with Dr. Galvez in 7-10 days, medical doctor and dietitian in 30 days. All appropriate [facility] from [facility] prior to discharge. Written discharge instruction explained and given to include VTE prevention. 58 year old female with PMH of laparoscopic vertical sleeve gastrectomy 4/2014, GERD, ineffective esophageal motility, hiatal hernia, NIDDM.  Patient scheduled to undergo Robot Aurelia En Y Gastric Bypass.    On 11/20/23 Ms. Martinez who has a history of ventral sleeve gastrectomy in 2014 and morbid obesity BMI 32, underwent Robot-assisted laparoscopic Aurelia-en-Y gastric bypass. Laparoscopic repair, hiatal hernia, paraesophageal. Bariatric ERAS protocol followed to include preoperative and perioperative use of DVT and SSI prophylaxis as well as multi-modal non-opioid analgesia. Patient tolerated the procedure well  extubated in the operating room then transferred to the PACU in stable condition. Once hemodynamically stable and effective pain control the patient was able to ambulate with assistance. Pt was also able to void within 4 hours following the procedure. The patient was later transferred to a bariatric unit and placed on bedside continuous pulse oximetry. Pain management included IV multi-modal non-opioid analgesia then transitioned to liquid as needed. The patient tolerated bariatric clear liquid the evening of surgery.    On POD #1 patient remained stable with no acute events overnight. Around mid-day she complained of abdominal pain after having protein shake. Repeat H/H stable. Pt remained overnight for observation. On POD #2, patient reports improved abdominal discomfort with Carafate and Simethicone. Patient met 8[facility]Point Bariatric Surgery D/C criteria and subsequently cleared for discharge home on POD#2. Prophylaxis dose of LMWH (40mg) x 14 days once daily. Patient education with teach back. The patient will follow a protocol-derived staged meal progression supervised by the dietitian in the outpatient setting. Patient will follow-up with Dr. Galvez in 7-10 days, medical doctor and dietitian in 30 days. All appropriate [facility] from [facility] prior to discharge. Written discharge instruction explained and given to include VTE prevention.

## 2023-11-20 NOTE — PATIENT PROFILE ADULT - FALL HARM RISK - HARM RISK INTERVENTIONS

## 2023-11-20 NOTE — DISCHARGE NOTE PROVIDER - CARE PROVIDER_API CALL
Dillon Galvez  Surgery  [address] [place] [id number]  Phone: [phone]  Fax: [phone]  Follow Up Time: 1 week

## 2023-11-20 NOTE — DISCHARGE NOTE PROVIDER - NSDCCPTREATMENT_GEN_ALL_CORE_FT
PRINCIPAL PROCEDURE  Procedure: Robot-assisted laparoscopic Aurelia-en-Y gastric bypass  Findings and Treatment: analgesia, dietary supplement, bariatric liquid diet, increase ambulation, DVT prophylaxis, follow up care

## 2023-11-20 NOTE — CHART NOTE - NSCHARTNOTEFT_GEN_A_CORE
POST-OP NOTE    STUART WHITE | [id number] | [facility] PACU 16    Procedure: s/p robotic hiatal hernia repair and nataly-en-y gastric bypass    Subjective: Patient seen and examined approximately 4 hours postoperatively. Reports feeling well after surgery. Tolerating ice chips. No nausea or vomiting. No flatus or BM yet. Ambulating to bathroom. Voided x3.     Vital Signs Last 24 Hrs  T(C): 36.2 (20 Nov 2023 13:30), Max: 36.4 (20 Nov 2023 05:41)  T(F): 97.2 (20 Nov 2023 13:30), Max: 97.5 (20 Nov 2023 05:41)  HR: 65 (20 Nov 2023 14:15) (62 - 95)  BP: 133/63 (20 Nov 2023 14:15) (126/68 - 149/69)  BP(mean): 91 (20 Nov 2023 14:15) (88 - 101)  RR: 18 (20 Nov 2023 14:15) (17 - 18)  SpO2: 100% (20 Nov 2023 14:15) (96% - 100%)    Parameters below as of 20 Nov 2023 14:15  Patient On (Oxygen Delivery Method): room air      I&O's Summary    20 Nov 2023 07:01  -  20 Nov 2023 14:44  --------------------------------------------------------  IN: 750 mL / OUT: 1200 mL / NET: -450 mL                            11.5   11.03 )-----------( 297      ( 20 Nov 2023 11:30 )             37.2     11-20    140  |  102  |  7   ----------------------------<  178<H>  3.4<L>   |  22  |  0.56    Ca    9.1      20 Nov 2023 11:31  Phos  3.6     11-20  Mg     2.4     11-20         PHYSICAL EXAM:  Gen: NAD  Resp: breathing easily, no stridor  CV: RRR  Abdomen: soft, nontender, nondistended. Dressings c/d/i with very minimal strikethrough.     Assessment: 59 yo F s/p robotic HHR and RYGB     Plan:  -Bariatric CLD  -LR at 150 cc/hr  -pain control: IV tylenol, toradol   -protonix daily   -subq heparin for dvt ppx  -activity as tolerated

## 2023-11-21 ENCOUNTER — TRANSCRIPTION ENCOUNTER (OUTPATIENT)
Age: 58
End: 2023-11-21

## 2023-11-21 LAB
ANION GAP SERPL CALC-SCNC: 12 MMOL/L — SIGNIFICANT CHANGE UP (ref 5–17)
ANION GAP SERPL CALC-SCNC: 12 MMOL/L — SIGNIFICANT CHANGE UP (ref 5–17)
BASOPHILS # BLD AUTO: 0.01 K/UL — SIGNIFICANT CHANGE UP (ref 0–0.2)
BASOPHILS # BLD AUTO: 0.01 K/UL — SIGNIFICANT CHANGE UP (ref 0–0.2)
BASOPHILS NFR BLD AUTO: 0.1 % — SIGNIFICANT CHANGE UP (ref 0–2)
BASOPHILS NFR BLD AUTO: 0.1 % — SIGNIFICANT CHANGE UP (ref 0–2)
BLD GP AB SCN SERPL QL: NEGATIVE — SIGNIFICANT CHANGE UP
BLD GP AB SCN SERPL QL: NEGATIVE — SIGNIFICANT CHANGE UP
BUN SERPL-MCNC: 5 MG/DL — LOW (ref 7–23)
BUN SERPL-MCNC: 5 MG/DL — LOW (ref 7–23)
CALCIUM SERPL-MCNC: 9.7 MG/DL — SIGNIFICANT CHANGE UP (ref 8.4–10.5)
CALCIUM SERPL-MCNC: 9.7 MG/DL — SIGNIFICANT CHANGE UP (ref 8.4–10.5)
CHLORIDE SERPL-SCNC: 106 MMOL/L — SIGNIFICANT CHANGE UP (ref 96–108)
CHLORIDE SERPL-SCNC: 106 MMOL/L — SIGNIFICANT CHANGE UP (ref 96–108)
CO2 SERPL-SCNC: 24 MMOL/L — SIGNIFICANT CHANGE UP (ref 22–31)
CO2 SERPL-SCNC: 24 MMOL/L — SIGNIFICANT CHANGE UP (ref 22–31)
CREAT SERPL-MCNC: 0.5 MG/DL — SIGNIFICANT CHANGE UP (ref 0.5–1.3)
CREAT SERPL-MCNC: 0.5 MG/DL — SIGNIFICANT CHANGE UP (ref 0.5–1.3)
EGFR: 109 ML/MIN/1.73M2 — SIGNIFICANT CHANGE UP
EGFR: 109 ML/MIN/1.73M2 — SIGNIFICANT CHANGE UP
EOSINOPHIL # BLD AUTO: 0 K/UL — SIGNIFICANT CHANGE UP (ref 0–0.5)
EOSINOPHIL # BLD AUTO: 0 K/UL — SIGNIFICANT CHANGE UP (ref 0–0.5)
EOSINOPHIL NFR BLD AUTO: 0 % — SIGNIFICANT CHANGE UP (ref 0–6)
EOSINOPHIL NFR BLD AUTO: 0 % — SIGNIFICANT CHANGE UP (ref 0–6)
GLUCOSE SERPL-MCNC: 109 MG/DL — HIGH (ref 70–99)
GLUCOSE SERPL-MCNC: 109 MG/DL — HIGH (ref 70–99)
HCT VFR BLD CALC: 32.5 % — LOW (ref 34.5–45)
HCT VFR BLD CALC: 32.5 % — LOW (ref 34.5–45)
HCT VFR BLD CALC: 35.4 % — SIGNIFICANT CHANGE UP (ref 34.5–45)
HCT VFR BLD CALC: 35.4 % — SIGNIFICANT CHANGE UP (ref 34.5–45)
HGB BLD-MCNC: 10.2 G/DL — LOW (ref 11.5–15.5)
HGB BLD-MCNC: 10.2 G/DL — LOW (ref 11.5–15.5)
HGB BLD-MCNC: 10.8 G/DL — LOW (ref 11.5–15.5)
HGB BLD-MCNC: 10.8 G/DL — LOW (ref 11.5–15.5)
IMM GRANULOCYTES NFR BLD AUTO: 0.5 % — SIGNIFICANT CHANGE UP (ref 0–0.9)
IMM GRANULOCYTES NFR BLD AUTO: 0.5 % — SIGNIFICANT CHANGE UP (ref 0–0.9)
LYMPHOCYTES # BLD AUTO: 0.81 K/UL — LOW (ref 1–3.3)
LYMPHOCYTES # BLD AUTO: 0.81 K/UL — LOW (ref 1–3.3)
LYMPHOCYTES # BLD AUTO: 6.5 % — LOW (ref 13–44)
LYMPHOCYTES # BLD AUTO: 6.5 % — LOW (ref 13–44)
MAGNESIUM SERPL-MCNC: 2.4 MG/DL — SIGNIFICANT CHANGE UP (ref 1.6–2.6)
MAGNESIUM SERPL-MCNC: 2.4 MG/DL — SIGNIFICANT CHANGE UP (ref 1.6–2.6)
MCHC RBC-ENTMCNC: 27.1 PG — SIGNIFICANT CHANGE UP (ref 27–34)
MCHC RBC-ENTMCNC: 27.1 PG — SIGNIFICANT CHANGE UP (ref 27–34)
MCHC RBC-ENTMCNC: 27.3 PG — SIGNIFICANT CHANGE UP (ref 27–34)
MCHC RBC-ENTMCNC: 27.3 PG — SIGNIFICANT CHANGE UP (ref 27–34)
MCHC RBC-ENTMCNC: 30.5 GM/DL — LOW (ref 32–36)
MCHC RBC-ENTMCNC: 30.5 GM/DL — LOW (ref 32–36)
MCHC RBC-ENTMCNC: 31.4 GM/DL — LOW (ref 32–36)
MCHC RBC-ENTMCNC: 31.4 GM/DL — LOW (ref 32–36)
MCV RBC AUTO: 87.1 FL — SIGNIFICANT CHANGE UP (ref 80–100)
MCV RBC AUTO: 87.1 FL — SIGNIFICANT CHANGE UP (ref 80–100)
MCV RBC AUTO: 88.9 FL — SIGNIFICANT CHANGE UP (ref 80–100)
MCV RBC AUTO: 88.9 FL — SIGNIFICANT CHANGE UP (ref 80–100)
MONOCYTES # BLD AUTO: 0.96 K/UL — HIGH (ref 0–0.9)
MONOCYTES # BLD AUTO: 0.96 K/UL — HIGH (ref 0–0.9)
MONOCYTES NFR BLD AUTO: 7.7 % — SIGNIFICANT CHANGE UP (ref 2–14)
MONOCYTES NFR BLD AUTO: 7.7 % — SIGNIFICANT CHANGE UP (ref 2–14)
NEUTROPHILS # BLD AUTO: 10.64 K/UL — HIGH (ref 1.8–7.4)
NEUTROPHILS # BLD AUTO: 10.64 K/UL — HIGH (ref 1.8–7.4)
NEUTROPHILS NFR BLD AUTO: 85.2 % — HIGH (ref 43–77)
NEUTROPHILS NFR BLD AUTO: 85.2 % — HIGH (ref 43–77)
NRBC # BLD: 0 /100 WBCS — SIGNIFICANT CHANGE UP (ref 0–0)
PHOSPHATE SERPL-MCNC: 2.9 MG/DL — SIGNIFICANT CHANGE UP (ref 2.5–4.5)
PHOSPHATE SERPL-MCNC: 2.9 MG/DL — SIGNIFICANT CHANGE UP (ref 2.5–4.5)
PLATELET # BLD AUTO: 274 K/UL — SIGNIFICANT CHANGE UP (ref 150–400)
PLATELET # BLD AUTO: 274 K/UL — SIGNIFICANT CHANGE UP (ref 150–400)
PLATELET # BLD AUTO: 306 K/UL — SIGNIFICANT CHANGE UP (ref 150–400)
PLATELET # BLD AUTO: 306 K/UL — SIGNIFICANT CHANGE UP (ref 150–400)
POTASSIUM SERPL-MCNC: 4.7 MMOL/L — SIGNIFICANT CHANGE UP (ref 3.5–5.3)
POTASSIUM SERPL-MCNC: 4.7 MMOL/L — SIGNIFICANT CHANGE UP (ref 3.5–5.3)
POTASSIUM SERPL-SCNC: 4.7 MMOL/L — SIGNIFICANT CHANGE UP (ref 3.5–5.3)
POTASSIUM SERPL-SCNC: 4.7 MMOL/L — SIGNIFICANT CHANGE UP (ref 3.5–5.3)
RBC # BLD: 3.73 M/UL — LOW (ref 3.8–5.2)
RBC # BLD: 3.73 M/UL — LOW (ref 3.8–5.2)
RBC # BLD: 3.98 M/UL — SIGNIFICANT CHANGE UP (ref 3.8–5.2)
RBC # BLD: 3.98 M/UL — SIGNIFICANT CHANGE UP (ref 3.8–5.2)
RBC # FLD: 13.2 % — SIGNIFICANT CHANGE UP (ref 10.3–14.5)
RBC # FLD: 13.2 % — SIGNIFICANT CHANGE UP (ref 10.3–14.5)
RBC # FLD: 13.5 % — SIGNIFICANT CHANGE UP (ref 10.3–14.5)
RBC # FLD: 13.5 % — SIGNIFICANT CHANGE UP (ref 10.3–14.5)
RH IG SCN BLD-IMP: POSITIVE — SIGNIFICANT CHANGE UP
RH IG SCN BLD-IMP: POSITIVE — SIGNIFICANT CHANGE UP
SODIUM SERPL-SCNC: 142 MMOL/L — SIGNIFICANT CHANGE UP (ref 135–145)
SODIUM SERPL-SCNC: 142 MMOL/L — SIGNIFICANT CHANGE UP (ref 135–145)
WBC # BLD: 11.95 K/UL — HIGH (ref 3.8–10.5)
WBC # BLD: 11.95 K/UL — HIGH (ref 3.8–10.5)
WBC # BLD: 12.48 K/UL — HIGH (ref 3.8–10.5)
WBC # BLD: 12.48 K/UL — HIGH (ref 3.8–10.5)
WBC # FLD AUTO: 11.95 K/UL — HIGH (ref 3.8–10.5)
WBC # FLD AUTO: 11.95 K/UL — HIGH (ref 3.8–10.5)
WBC # FLD AUTO: 12.48 K/UL — HIGH (ref 3.8–10.5)
WBC # FLD AUTO: 12.48 K/UL — HIGH (ref 3.8–10.5)

## 2023-11-21 RX ORDER — HYOSCYAMINE SULFATE 0.13 MG
1 TABLET ORAL
Qty: 28 | Refills: 0
Start: 2023-11-21 | End: 2023-11-27

## 2023-11-21 RX ORDER — OMEPRAZOLE 10 MG/1
1 CAPSULE, DELAYED RELEASE ORAL
Refills: 0 | DISCHARGE

## 2023-11-21 RX ORDER — OMEPRAZOLE 10 MG/1
1 CAPSULE, DELAYED RELEASE ORAL
Qty: 30 | Refills: 0
Start: 2023-11-21 | End: 2023-12-20

## 2023-11-21 RX ORDER — ENOXAPARIN SODIUM 100 MG/ML
40 INJECTION SUBCUTANEOUS
Qty: 5.6 | Refills: 0
Start: 2023-11-21 | End: 2023-12-04

## 2023-11-21 RX ORDER — SIMETHICONE 80 MG/1
80 TABLET, CHEWABLE ORAL EVERY 8 HOURS
Refills: 0 | Status: DISCONTINUED | OUTPATIENT
Start: 2023-11-21 | End: 2023-11-22

## 2023-11-21 RX ORDER — ACETAMINOPHEN 500 MG
650 TABLET ORAL EVERY 6 HOURS
Refills: 0 | Status: DISCONTINUED | OUTPATIENT
Start: 2023-11-21 | End: 2023-11-22

## 2023-11-21 RX ORDER — ACETAMINOPHEN 500 MG
15 TABLET ORAL
Qty: 300 | Refills: 0
Start: 2023-11-21 | End: 2023-11-25

## 2023-11-21 RX ORDER — ONDANSETRON 8 MG/1
1 TABLET, FILM COATED ORAL
Qty: 28 | Refills: 0
Start: 2023-11-21 | End: 2023-11-27

## 2023-11-21 RX ORDER — ACETAMINOPHEN 500 MG
1000 TABLET ORAL ONCE
Refills: 0 | Status: COMPLETED | OUTPATIENT
Start: 2023-11-21 | End: 2023-11-21

## 2023-11-21 RX ORDER — SEMAGLUTIDE 0.68 MG/ML
1.7 INJECTION, SOLUTION SUBCUTANEOUS
Refills: 0 | DISCHARGE

## 2023-11-21 RX ADMIN — ONDANSETRON 4 MILLIGRAM(S): 8 TABLET, FILM COATED ORAL at 23:50

## 2023-11-21 RX ADMIN — HEPARIN SODIUM 5000 UNIT(S): 5000 INJECTION INTRAVENOUS; SUBCUTANEOUS at 08:27

## 2023-11-21 RX ADMIN — Medication 1000 MILLIGRAM(S): at 03:30

## 2023-11-21 RX ADMIN — PANTOPRAZOLE SODIUM 40 MILLIGRAM(S): 20 TABLET, DELAYED RELEASE ORAL at 12:30

## 2023-11-21 RX ADMIN — SODIUM CHLORIDE 150 MILLILITER(S): 9 INJECTION, SOLUTION INTRAVENOUS at 21:40

## 2023-11-21 RX ADMIN — SODIUM CHLORIDE 150 MILLILITER(S): 9 INJECTION, SOLUTION INTRAVENOUS at 05:03

## 2023-11-21 RX ADMIN — Medication 30 MILLIGRAM(S): at 15:49

## 2023-11-21 RX ADMIN — Medication 30 MILLIGRAM(S): at 22:00

## 2023-11-21 RX ADMIN — Medication 1000 MILLIGRAM(S): at 08:58

## 2023-11-21 RX ADMIN — Medication 650 MILLIGRAM(S): at 23:49

## 2023-11-21 RX ADMIN — Medication 30 MILLIGRAM(S): at 10:33

## 2023-11-21 RX ADMIN — Medication 30 MILLIGRAM(S): at 21:39

## 2023-11-21 RX ADMIN — Medication 400 MILLIGRAM(S): at 16:41

## 2023-11-21 RX ADMIN — Medication 30 MILLIGRAM(S): at 05:20

## 2023-11-21 RX ADMIN — HEPARIN SODIUM 5000 UNIT(S): 5000 INJECTION INTRAVENOUS; SUBCUTANEOUS at 23:49

## 2023-11-21 RX ADMIN — Medication 30 MILLIGRAM(S): at 10:03

## 2023-11-21 RX ADMIN — Medication 30 MILLIGRAM(S): at 05:03

## 2023-11-21 RX ADMIN — Medication 1000 MILLIGRAM(S): at 17:11

## 2023-11-21 RX ADMIN — Medication 400 MILLIGRAM(S): at 03:12

## 2023-11-21 RX ADMIN — Medication 400 MILLIGRAM(S): at 08:28

## 2023-11-21 RX ADMIN — ONDANSETRON 4 MILLIGRAM(S): 8 TABLET, FILM COATED ORAL at 05:03

## 2023-11-21 RX ADMIN — HEPARIN SODIUM 5000 UNIT(S): 5000 INJECTION INTRAVENOUS; SUBCUTANEOUS at 15:49

## 2023-11-21 NOTE — PROGRESS NOTE ADULT - SUBJECTIVE AND OBJECTIVE BOX
Post Op Day#: 1    Subjective: "Comfortable, no pain, drinking liquid, no problem."    Objective: No acute events overnight, effective pain control, tolerating liquid diet, no N/V. Continuous pulse oximetry at bedside functioning,  v/s stable, afebrile. Labs within acceptable range.  Ambulated independently around the unit.  Tolerating bariatric clear liquid diet .  Voiding as expected. Insulin sliding scale for glucose control                                              Vital Signs Last 24 Hrs  T(C): 36.9 (21 Nov 2023 04:47), Max: 37.1 (20 Nov 2023 17:45)  T(F): 98.5 (21 Nov 2023 04:47), Max: 98.8 (21 Nov 2023 00:43)  HR: 63 (21 Nov 2023 04:47) (60 - 95)  BP: 119/62 (21 Nov 2023 04:47) (119/62 - 152/79)  BP(mean): 85 (20 Nov 2023 14:45) (85 - 101)  RR: 18 (21 Nov 2023 04:47) (17 - 18)  SpO2: 99% (21 Nov 2023 04:47) (96% - 100%)    Parameters below as of 21 Nov 2023 04:47  Patient On (Oxygen Delivery Method): room air                                                   I&O's Summary    20 Nov 2023 07:01  -  21 Nov 2023 07:00  --------------------------------------------------------  IN: 4325 mL / OUT: 4300 mL / NET: 25 mL                                                                          10.8   12.48 )-----------( 306      ( 21 Nov 2023 06:05 )             35.4                                                 11-21    142  |  106  |  5<L>  ----------------------------<  109<H>  4.7   |  24  |  0.50    Ca    9.7      21 Nov 2023 06:04  Phos  2.9     11-21  Mg     2.4     11-21      acetaminophen   IVPB .. 1000 milliGRAM(s) IV Intermittent once  heparin   Injectable 5000 Unit(s) SubCutaneous every 8 hours  ketorolac   Injectable 30 milliGRAM(s) IV Push every 6 hours  lactated ringers. 1000 milliLiter(s) IV Continuous <Continuous>  ondansetron Injectable 4 milliGRAM(s) IV Push every 6 hours  oxyCODONE    Solution 5 milliGRAM(s) Oral every 6 hours PRN  pantoprazole  Injectable 40 milliGRAM(s) IV Push daily      Physical Exam:         Lungs:  clear breath sounds b/l       Heart:  Regular rate & rhythm       Abdomen:  Soft, non-distended.  Scopes sites clean, dry and intact. + bs, - flatus, no rebound or guarding       Skin:  intact, pannus w/o rash       Extremities: + pulses, no edema, no calf tenderness, negative steve's     VTE Extended Risk Assessment Scores             [facility]  VTE Predicted RISK low:   ( <1%  ),     Caprini DVT Risk: 6 points (High)    Assessment and Plan: 58 year old obese female, BMI 32.0 with hx of diabetes and sleeve gastrectomy (2014). On 11/20/2023 she had Lap Robotic Assisted RY Gastric Bypass POD # 1 stable    - FS Q 6 hours  - Bariatric Clear diet today then protocol derived staged meal progression supervised by RD in outpatient setting  - DVT - LMWH x 14 days (patient education with teach back). GI prophylaxis, Incentive spirometry  - Ambulate as tolerated  - Procedure specific education including postop complications, medications and side effects, diet, vitamins and VTE prevention. Written materials given  - Medication reviewed and reconciled, Bariatric meds obtained from [facility] prior to d/c  - D/C home this afternoon once Bariatric 8-Point d/c criteria met  - Follow up with Dr Galvez in 7-10 days, Dietitian and PMD in 30 days.      Talia Miller, YARELI, ANP  [phone]

## 2023-11-21 NOTE — DIETITIAN INITIAL EVALUATION ADULT - EDUCATION DIETARY MODIFICATIONS
Laparoscopic Aurelia-en-Y gastric bypass nutrition recommendations reviewed/reinforced (discharge instruction handout referenced).  Including vitamin/supplement compliance as instructed by team, full liquid diet items reviewed, sugar-free, low-fat, no straws, no carbonated beverages, 6 small meals per day, no water during meals-drink water 30min-1 hr prior to or after meals. Adequate hydration and adequate protein emphasized. Pt encouraged to follow-up with outpatient RD./teach back/(2) meets goals/outcomes/verbalization

## 2023-11-21 NOTE — PROGRESS NOTE ADULT - SUBJECTIVE AND OBJECTIVE BOX
[facility] DAILY PROGRESS NOTE    SUBJECTIVE: Overnight no acute events. Patient seen and evaluated this am.    OBJECTIVE:  Vital Signs Last 24 Hrs  T(C): 37.1 (21 Nov 2023 00:43), Max: 37.1 (20 Nov 2023 17:45)  T(F): 98.8 (21 Nov 2023 00:43), Max: 98.8 (21 Nov 2023 00:43)  HR: 62 (21 Nov 2023 00:43) (60 - 95)  BP: 131/74 (21 Nov 2023 00:43) (122/59 - 152/79)  BP(mean): 85 (20 Nov 2023 14:45) (85 - 101)  RR: 18 (21 Nov 2023 00:43) (17 - 18)  SpO2: 98% (21 Nov 2023 00:43) (96% - 100%)    Parameters below as of 21 Nov 2023 00:43  Patient On (Oxygen Delivery Method): room air      Daily Height in cm: 172.7 (20 Nov 2023 07:57)      STANDING  acetaminophen   IVPB .. 1000 milliGRAM(s) IV Intermittent once  acetaminophen   IVPB .. 1000 milliGRAM(s) IV Intermittent once  heparin   Injectable 5000 Unit(s) SubCutaneous every 8 hours  ketorolac   Injectable 30 milliGRAM(s) IV Push every 6 hours  lactated ringers. 1000 milliLiter(s) (150 mL/Hr) IV Continuous <Continuous>  ondansetron Injectable 4 milliGRAM(s) IV Push every 6 hours  pantoprazole  Injectable 40 milliGRAM(s) IV Push daily    PRN  oxyCODONE    Solution 5 milliGRAM(s) Oral every 6 hours PRN Severe Pain (7 - 10)      Labs:  140  |  102  |  7  ----------------------------<  178<H>    (11-20)  3.4<L>   |  22  |  0.56          Ca    9.1      11-20  Mg    2.4  Phos  3.6          Urinalysis Basic - ( 20 Nov 2023 11:31 )    Color: x / Appearance: x / SG: x / pH: x  Gluc: 178 mg/dL / Ketone: x  / Bili: x / Urobili: x   Blood: x / Protein: x / Nitrite: x   Leuk Esterase: x / RBC: x / WBC x   Sq Epi: x / Non Sq Epi: x / Bacteria: x      Physical Exam:  General: NAD  Respiratory: respirations non labored  Abdominal: soft, nontender, nondistended. Dressings c/d/i with very minimal strikethrough.   Extremities: FROM, warm  Neurological: A+Ox3    [facility] DAILY PROGRESS NOTE    SUBJECTIVE: Overnight no acute events. Patient seen and evaluated this am. Patient sitting at bedside this morning and no complaints of any nausea or vomiting. Appropriate mild tenderness at port sites. Passing gas. Tolerating clears diet.     OBJECTIVE:  Vital Signs Last 24 Hrs  T(C): 37.1 (21 Nov 2023 00:43), Max: 37.1 (20 Nov 2023 17:45)  T(F): 98.8 (21 Nov 2023 00:43), Max: 98.8 (21 Nov 2023 00:43)  HR: 62 (21 Nov 2023 00:43) (60 - 95)  BP: 131/74 (21 Nov 2023 00:43) (122/59 - 152/79)  BP(mean): 85 (20 Nov 2023 14:45) (85 - 101)  RR: 18 (21 Nov 2023 00:43) (17 - 18)  SpO2: 98% (21 Nov 2023 00:43) (96% - 100%)    Parameters below as of 21 Nov 2023 00:43  Patient On (Oxygen Delivery Method): room air      Daily Height in cm: 172.7 (20 Nov 2023 07:57)      STANDING  acetaminophen   IVPB .. 1000 milliGRAM(s) IV Intermittent once  acetaminophen   IVPB .. 1000 milliGRAM(s) IV Intermittent once  heparin   Injectable 5000 Unit(s) SubCutaneous every 8 hours  ketorolac   Injectable 30 milliGRAM(s) IV Push every 6 hours  lactated ringers. 1000 milliLiter(s) (150 mL/Hr) IV Continuous <Continuous>  ondansetron Injectable 4 milliGRAM(s) IV Push every 6 hours  pantoprazole  Injectable 40 milliGRAM(s) IV Push daily    PRN  oxyCODONE    Solution 5 milliGRAM(s) Oral every 6 hours PRN Severe Pain (7 - 10)      Labs:  140  |  102  |  7  ----------------------------<  178<H>    (11-20)  3.4<L>   |  22  |  0.56          Ca    9.1      11-20  Mg    2.4  Phos  3.6          Urinalysis Basic - ( 20 Nov 2023 11:31 )    Color: x / Appearance: x / SG: x / pH: x  Gluc: 178 mg/dL / Ketone: x  / Bili: x / Urobili: x   Blood: x / Protein: x / Nitrite: x   Leuk Esterase: x / RBC: x / WBC x   Sq Epi: x / Non Sq Epi: x / Bacteria: x      Physical Exam:  General: NAD  Respiratory: respirations non labored  Abdominal: soft, nontender, nondistended. Dressings c/d/i with very minimal strikethrough.   Extremities: FROM, warm  Neurological: A+Ox3

## 2023-11-21 NOTE — DISCHARGE NOTE NURSING/CASE MANAGEMENT/SOCIAL WORK - NSDCPEFALRISK_GEN_ALL_CORE
For information on Fall & Injury Prevention, visit: [email or web address] OR  [email or web address] OR  https://www.cdc.gov/steadi/patient.html

## 2023-11-21 NOTE — DIETITIAN INITIAL EVALUATION ADULT - ORAL INTAKE PTA/DIET HISTORY
Pt reports following a full liquid diet for 2 weeks PTA as instructed by outpatient RD. Pt reports having Premier Protein shakes, soups, greek yogurt. NKFA. Pt denies chewing/swallowing difficulty, nausea, vomiting, diarrhea, constipation.

## 2023-11-21 NOTE — DIETITIAN INITIAL EVALUATION ADULT - ENERGY INTAKE
Fair (50-75%)  Pt now S/P laparoscopic gastric bypass. Pt denies N+V, sipping on bariatric clear liquids during RD visit. Pt with knowledge of bariatric full liquid diet and receptive to in depth review/reinforcement. Pt reports home stock of protein shakes with plans to purchase more. Pt reports plan to purchase the necessary vitamins/minerals in chewable/liquid/crushable form including a multivitamin with added elemental iron, calcium citrate with vitamin D, vitamin C, thiamine and sublingual B12. Pt was advised to take the multivitamin with elemental iron at least 2 hours apart from the calcium citrate with vitamin D for optimal absorption. Pt plans to schedule a follow up appointment with outpatient RD. Pt able to teach back all points discussed during interview.

## 2023-11-21 NOTE — DIETITIAN INITIAL EVALUATION ADULT - OTHER INFO
Pt with multiple previous wt loss attempt, gastric sleeve and was unable to lose and maintain significant wt loss. Weight per [facility]: 195lbs (8/8/22). Pre-surgical wt (H&P) noted as 210 pounds (11/2). Current wt of 209.7 pounds (11/20).     Per H&P pt on Wegovy previously

## 2023-11-21 NOTE — DISCHARGE NOTE NURSING/CASE MANAGEMENT/SOCIAL WORK - PATIENT PORTAL LINK FT
You can access the FollowMyHealth Patient Portal offered by [facility] by registering at the following website: [email or web address]. By joining [employer]’s FollowMyHealth portal, you will also be able to view your health information using other applications (apps) compatible with our system.

## 2023-11-21 NOTE — DIETITIAN INITIAL EVALUATION ADULT - PERTINENT LABORATORY DATA
11-21    142  |  106  |  5<L>  ----------------------------<  109<H>  4.7   |  24  |  0.50    Ca    9.7      21 Nov 2023 06:04  Phos  2.9     11-21  Mg     2.4     11-21    POCT Blood Glucose.: 159 mg/dL (11-20-23 @ 11:06)  A1C with Estimated Average Glucose Result: 5.2 % (11-02-23 @ 16:28)

## 2023-11-21 NOTE — DIETITIAN INITIAL EVALUATION ADULT - REASON FOR ADMISSION
Gastroesophageal reflux disease with esophagitis without hemorrhage    Chart reviewed, events noted. This is a 58 year old female with hx of diabetes and sleeve gastrectomy (2014). Pt now s/p Lap Robotic Assisted RY Gastric Bypass POD # 1

## 2023-11-21 NOTE — PROGRESS NOTE ADULT - ASSESSMENT
58 year old female with PMH of laparoscopic vertical sleeve gastrectomy 4/2014, GERD, ineffective esophageal motility, hiatal hernia, NIDDM. Now s/p robotic hiatal hernia repair and Aurelia En Y Gastric Bypass (11/20).    Plan:  -Bariatric CLD  -LR at 150 cc/hr  -pain control: IV tylenol, toradol   -protonix daily   -subq heparin for dvt ppx  -activity as tolerated    [facility]  [id number] 58 year old female with PMH of laparoscopic vertical sleeve gastrectomy 4/2014, GERD, ineffective esophageal motility, hiatal hernia, NIDDM. Now s/p robotic hiatal hernia repair and Aurelia En Y Gastric Bypass (11/20).    Plan:  POD#1  -Bariatric CLD  -LR at 150 cc/hr  -pain control: IV tylenol, toradol   -protonix daily   -subq heparin for dvt ppx  -activity as tolerated  - Dispo- home when meets d/c criteria      [facility]  [id number] Wound Care (No Sutures): Mastisol

## 2023-11-21 NOTE — DIETITIAN INITIAL EVALUATION ADULT - PERTINENT MEDS FT
MEDICATIONS  (STANDING):  heparin   Injectable 5000 Unit(s) SubCutaneous every 8 hours  ketorolac   Injectable 30 milliGRAM(s) IV Push every 6 hours  lactated ringers. 1000 milliLiter(s) (150 mL/Hr) IV Continuous <Continuous>  ondansetron Injectable 4 milliGRAM(s) IV Push every 6 hours  pantoprazole  Injectable 40 milliGRAM(s) IV Push daily    MEDICATIONS  (PRN):  oxyCODONE    Solution 5 milliGRAM(s) Oral every 6 hours PRN Severe Pain (7 - 10)

## 2023-11-21 NOTE — DIETITIAN INITIAL EVALUATION ADULT - OTHER CALCULATIONS
estimated needs based on IBW with consideration for BMI, desired weight loss and recent bariatric surgery

## 2023-11-21 NOTE — PHARMACOTHERAPY INTERVENTION NOTE - COMMENTS
S/p Aurelia-en-Y Gastric Bypass on 11/20/2023.    Patient was instructed to use crushed, dissolvable, chewable, or liquid formulations of medications for 1 month after surgery. Patient was informed to take daily multivitamins post surgically. Patient reeducated on NSAID avoidance (ibuprofen, ASA, naproxen, aleve) as they increase risk of GI bleeding; may use APAP for mild pain otherwise contact prescriber for consult. Patient was informed on indications and directions for administration for acetaminophen liquid, hyoscyamine SL, ondansetron ODT, lovenox SQ, and omeprazole DR. Lexis Trevizo, PharmD, [facility]  Clinical Pharmacy Specialist  Available on Teams

## 2023-11-22 LAB
ANION GAP SERPL CALC-SCNC: 10 MMOL/L — SIGNIFICANT CHANGE UP (ref 5–17)
ANION GAP SERPL CALC-SCNC: 10 MMOL/L — SIGNIFICANT CHANGE UP (ref 5–17)
BUN SERPL-MCNC: 6 MG/DL — LOW (ref 7–23)
BUN SERPL-MCNC: 6 MG/DL — LOW (ref 7–23)
CALCIUM SERPL-MCNC: 9.7 MG/DL — SIGNIFICANT CHANGE UP (ref 8.4–10.5)
CALCIUM SERPL-MCNC: 9.7 MG/DL — SIGNIFICANT CHANGE UP (ref 8.4–10.5)
CHLORIDE SERPL-SCNC: 106 MMOL/L — SIGNIFICANT CHANGE UP (ref 96–108)
CHLORIDE SERPL-SCNC: 106 MMOL/L — SIGNIFICANT CHANGE UP (ref 96–108)
CO2 SERPL-SCNC: 25 MMOL/L — SIGNIFICANT CHANGE UP (ref 22–31)
CO2 SERPL-SCNC: 25 MMOL/L — SIGNIFICANT CHANGE UP (ref 22–31)
CREAT SERPL-MCNC: 0.51 MG/DL — SIGNIFICANT CHANGE UP (ref 0.5–1.3)
CREAT SERPL-MCNC: 0.51 MG/DL — SIGNIFICANT CHANGE UP (ref 0.5–1.3)
EGFR: 109 ML/MIN/1.73M2 — SIGNIFICANT CHANGE UP
EGFR: 109 ML/MIN/1.73M2 — SIGNIFICANT CHANGE UP
GLUCOSE SERPL-MCNC: 73 MG/DL — SIGNIFICANT CHANGE UP (ref 70–99)
GLUCOSE SERPL-MCNC: 73 MG/DL — SIGNIFICANT CHANGE UP (ref 70–99)
HCT VFR BLD CALC: 34.6 % — SIGNIFICANT CHANGE UP (ref 34.5–45)
HCT VFR BLD CALC: 34.6 % — SIGNIFICANT CHANGE UP (ref 34.5–45)
HGB BLD-MCNC: 10.7 G/DL — LOW (ref 11.5–15.5)
HGB BLD-MCNC: 10.7 G/DL — LOW (ref 11.5–15.5)
MAGNESIUM SERPL-MCNC: 2 MG/DL — SIGNIFICANT CHANGE UP (ref 1.6–2.6)
MAGNESIUM SERPL-MCNC: 2 MG/DL — SIGNIFICANT CHANGE UP (ref 1.6–2.6)
MCHC RBC-ENTMCNC: 27.5 PG — SIGNIFICANT CHANGE UP (ref 27–34)
MCHC RBC-ENTMCNC: 27.5 PG — SIGNIFICANT CHANGE UP (ref 27–34)
MCHC RBC-ENTMCNC: 30.9 GM/DL — LOW (ref 32–36)
MCHC RBC-ENTMCNC: 30.9 GM/DL — LOW (ref 32–36)
MCV RBC AUTO: 88.9 FL — SIGNIFICANT CHANGE UP (ref 80–100)
MCV RBC AUTO: 88.9 FL — SIGNIFICANT CHANGE UP (ref 80–100)
NRBC # BLD: 0 /100 WBCS — SIGNIFICANT CHANGE UP (ref 0–0)
NRBC # BLD: 0 /100 WBCS — SIGNIFICANT CHANGE UP (ref 0–0)
PHOSPHATE SERPL-MCNC: 2.4 MG/DL — LOW (ref 2.5–4.5)
PHOSPHATE SERPL-MCNC: 2.4 MG/DL — LOW (ref 2.5–4.5)
PLATELET # BLD AUTO: 264 K/UL — SIGNIFICANT CHANGE UP (ref 150–400)
PLATELET # BLD AUTO: 264 K/UL — SIGNIFICANT CHANGE UP (ref 150–400)
POTASSIUM SERPL-MCNC: 3.7 MMOL/L — SIGNIFICANT CHANGE UP (ref 3.5–5.3)
POTASSIUM SERPL-MCNC: 3.7 MMOL/L — SIGNIFICANT CHANGE UP (ref 3.5–5.3)
POTASSIUM SERPL-SCNC: 3.7 MMOL/L — SIGNIFICANT CHANGE UP (ref 3.5–5.3)
POTASSIUM SERPL-SCNC: 3.7 MMOL/L — SIGNIFICANT CHANGE UP (ref 3.5–5.3)
RBC # BLD: 3.89 M/UL — SIGNIFICANT CHANGE UP (ref 3.8–5.2)
RBC # BLD: 3.89 M/UL — SIGNIFICANT CHANGE UP (ref 3.8–5.2)
RBC # FLD: 13.6 % — SIGNIFICANT CHANGE UP (ref 10.3–14.5)
RBC # FLD: 13.6 % — SIGNIFICANT CHANGE UP (ref 10.3–14.5)
SODIUM SERPL-SCNC: 141 MMOL/L — SIGNIFICANT CHANGE UP (ref 135–145)
SODIUM SERPL-SCNC: 141 MMOL/L — SIGNIFICANT CHANGE UP (ref 135–145)
WBC # BLD: 7.44 K/UL — SIGNIFICANT CHANGE UP (ref 3.8–10.5)
WBC # BLD: 7.44 K/UL — SIGNIFICANT CHANGE UP (ref 3.8–10.5)
WBC # FLD AUTO: 7.44 K/UL — SIGNIFICANT CHANGE UP (ref 3.8–10.5)
WBC # FLD AUTO: 7.44 K/UL — SIGNIFICANT CHANGE UP (ref 3.8–10.5)

## 2023-11-22 PROCEDURE — 85027 COMPLETE CBC AUTOMATED: CPT

## 2023-11-22 PROCEDURE — 86850 RBC ANTIBODY SCREEN: CPT

## 2023-11-22 PROCEDURE — 80048 BASIC METABOLIC PNL TOTAL CA: CPT

## 2023-11-22 PROCEDURE — 86900 BLOOD TYPING SEROLOGIC ABO: CPT

## 2023-11-22 PROCEDURE — C9399: CPT

## 2023-11-22 PROCEDURE — 82962 GLUCOSE BLOOD TEST: CPT

## 2023-11-22 PROCEDURE — 36415 COLL VENOUS BLD VENIPUNCTURE: CPT

## 2023-11-22 PROCEDURE — 85025 COMPLETE CBC W/AUTO DIFF WBC: CPT

## 2023-11-22 PROCEDURE — 84100 ASSAY OF PHOSPHORUS: CPT

## 2023-11-22 PROCEDURE — 86901 BLOOD TYPING SEROLOGIC RH(D): CPT

## 2023-11-22 PROCEDURE — C1889: CPT

## 2023-11-22 PROCEDURE — 83735 ASSAY OF MAGNESIUM: CPT

## 2023-11-22 PROCEDURE — S2900: CPT

## 2023-11-22 RX ORDER — SUCRALFATE 1 G
10 TABLET ORAL
Qty: 280 | Refills: 0
Start: 2023-11-22 | End: 2023-11-28

## 2023-11-22 RX ORDER — SUCRALFATE 1 G
1 TABLET ORAL
Refills: 0 | Status: DISCONTINUED | OUTPATIENT
Start: 2023-11-22 | End: 2023-11-22

## 2023-11-22 RX ORDER — SIMETHICONE 80 MG/1
1 TABLET, CHEWABLE ORAL
Qty: 21 | Refills: 0
Start: 2023-11-22 | End: 2023-11-28

## 2023-11-22 RX ORDER — INFLUENZA VIRUS VACCINE 15; 15; 15; 15 UG/.5ML; UG/.5ML; UG/.5ML; UG/.5ML
0.5 SUSPENSION INTRAMUSCULAR ONCE
Refills: 0 | Status: DISCONTINUED | OUTPATIENT
Start: 2023-11-22 | End: 2023-11-22

## 2023-11-22 RX ORDER — POTASSIUM PHOSPHATE, MONOBASIC POTASSIUM PHOSPHATE, DIBASIC 236; 224 MG/ML; MG/ML
15 INJECTION, SOLUTION INTRAVENOUS ONCE
Refills: 0 | Status: COMPLETED | OUTPATIENT
Start: 2023-11-22 | End: 2023-11-22

## 2023-11-22 RX ADMIN — Medication 1 GRAM(S): at 14:28

## 2023-11-22 RX ADMIN — Medication 650 MILLIGRAM(S): at 05:32

## 2023-11-22 RX ADMIN — Medication 650 MILLIGRAM(S): at 05:52

## 2023-11-22 RX ADMIN — ONDANSETRON 4 MILLIGRAM(S): 8 TABLET, FILM COATED ORAL at 05:32

## 2023-11-22 RX ADMIN — Medication 1 GRAM(S): at 09:39

## 2023-11-22 RX ADMIN — Medication 650 MILLIGRAM(S): at 00:06

## 2023-11-22 RX ADMIN — POTASSIUM PHOSPHATE, MONOBASIC POTASSIUM PHOSPHATE, DIBASIC 62.5 MILLIMOLE(S): 236; 224 INJECTION, SOLUTION INTRAVENOUS at 08:43

## 2023-11-22 RX ADMIN — PANTOPRAZOLE SODIUM 40 MILLIGRAM(S): 20 TABLET, DELAYED RELEASE ORAL at 12:13

## 2023-11-22 RX ADMIN — Medication 30 MILLIGRAM(S): at 04:11

## 2023-11-22 RX ADMIN — SODIUM CHLORIDE 150 MILLILITER(S): 9 INJECTION, SOLUTION INTRAVENOUS at 03:42

## 2023-11-22 RX ADMIN — HEPARIN SODIUM 5000 UNIT(S): 5000 INJECTION INTRAVENOUS; SUBCUTANEOUS at 08:44

## 2023-11-22 RX ADMIN — Medication 30 MILLIGRAM(S): at 09:41

## 2023-11-22 RX ADMIN — Medication 30 MILLIGRAM(S): at 03:41

## 2023-11-22 RX ADMIN — Medication 30 MILLIGRAM(S): at 10:11

## 2023-11-22 NOTE — PROGRESS NOTE ADULT - ASSESSMENT
58 year old female with PMH of laparoscopic vertical sleeve gastrectomy 4/2014, GERD, ineffective esophageal motility, hiatal hernia, NIDDM. Now s/p robotic hiatal hernia repair and Aurelia En Y Gastric Bypass (11/20).    Plan:  - Bariatric CLD  - LR at 150 cc/hr  - pain control: tylenol solution, toradol   - protonix daily   - SQH for dvt ppx  - activity as tolerated  - Dispo- home when meets d/c criteria    [facility]  [id number]   58 year old female with PMH of laparoscopic vertical sleeve gastrectomy 4/2014, GERD, ineffective esophageal motility, hiatal hernia, NIDDM. Now s/p robotic hiatal hernia repair and Aurelia En Y Gastric Bypass (11/20).    Plan:  - Bariatric CLD  - LR at 150 cc/hr  - pain control: tylenol solution, toradol   - protonix daily   - continue with carafate and simethicone  - SQH for dvt ppx  - activity as tolerated  - will monitor PO tolerance today, possibly DC home this afternoon      Luis Manuel Mccoy PGY6  MIS/Bariatric Fellow  [facility]  [id number]

## 2023-11-22 NOTE — PROGRESS NOTE ADULT - REASON FOR ADMISSION
Lap Robotic Assisted Conversion of Sleeve to RY Gastric Bypass
Lap Robotic Assisted Conversion of VSG to RYGB

## 2023-11-22 NOTE — PROGRESS NOTE ADULT - SUBJECTIVE AND OBJECTIVE BOX
[facility] DAILY PROGRESS NOTE    SUBJECTIVE: Overnight no acute events. Patient seen and evaluated this am. Appropriate mild tenderness at port sites. Passing gas. Tolerating clears diet.        OBJECTIVE:  Vital Signs Last 24 Hrs  T(C): 36.9 (22 Nov 2023 00:01), Max: 37.2 (21 Nov 2023 20:48)  T(F): 98.4 (22 Nov 2023 00:01), Max: 98.9 (21 Nov 2023 20:48)  HR: 61 (22 Nov 2023 00:01) (48 - 63)  BP: 121/69 (22 Nov 2023 00:01) (119/62 - 135/64)  BP(mean): --  RR: 18 (22 Nov 2023 00:01) (18 - 18)  SpO2: 98% (22 Nov 2023 00:01) (98% - 100%)    Parameters below as of 22 Nov 2023 00:01  Patient On (Oxygen Delivery Method): room air      STANDING  acetaminophen   Oral Liquid .. 650 milliGRAM(s) Oral every 6 hours  heparin   Injectable 5000 Unit(s) SubCutaneous every 8 hours  ketorolac   Injectable 30 milliGRAM(s) IV Push every 6 hours  lactated ringers. 1000 milliLiter(s) (150 mL/Hr) IV Continuous <Continuous>  ondansetron Injectable 4 milliGRAM(s) IV Push every 6 hours  pantoprazole  Injectable 40 milliGRAM(s) IV Push daily    PRN  oxyCODONE    Solution 5 milliGRAM(s) Oral every 6 hours PRN Severe Pain (7 - 10)  simethicone 80 milliGRAM(s) Chew every 8 hours PRN Indigestion      Labs:  142  |  106  |  5<L>  ----------------------------<  109<H>    (11-21)  4.7   |  24  |  0.50          Ca    9.7      11-21  Mg    2.4  Phos  2.9          Urinalysis Basic - ( 21 Nov 2023 06:04 )    Color: x / Appearance: x / SG: x / pH: x  Gluc: 109 mg/dL / Ketone: x  / Bili: x / Urobili: x   Blood: x / Protein: x / Nitrite: x   Leuk Esterase: x / RBC: x / WBC x   Sq Epi: x / Non Sq Epi: x / Bacteria: x    Physical Exam:  General: NAD  Respiratory: respirations non labored  Abdominal: soft, nontender, nondistended. Dressings c/d/i with very minimal strikethrough.  Extremities: FROM, warm  Neurological: A+Ox3   [facility] TE[facility] DAILY PROGRESS NOTE    SUBJECTIVE:  Patient developed worsening epigastric pain, in the PM, stayed overnight for observation.. Patient seen and evaluated this am. Appropriate mild tenderness at port sites. Passing gas. Tolerating clears diet, but having worsening discomfort         OBJECTIVE:  Vital Signs Last 24 Hrs  T(C): 36.9 (22 Nov 2023 00:01), Max: 37.2 (21 Nov 2023 20:48)  T(F): 98.4 (22 Nov 2023 00:01), Max: 98.9 (21 Nov 2023 20:48)  HR: 61 (22 Nov 2023 00:01) (48 - 63)  BP: 121/69 (22 Nov 2023 00:01) (119/62 - 135/64)  BP(mean): --  RR: 18 (22 Nov 2023 00:01) (18 - 18)  SpO2: 98% (22 Nov 2023 00:01) (98% - 100%)    Parameters below as of 22 Nov 2023 00:01  Patient On (Oxygen Delivery Method): room air      STANDING  acetaminophen   Oral Liquid .. 650 milliGRAM(s) Oral every 6 hours  heparin   Injectable 5000 Unit(s) SubCutaneous every 8 hours  ketorolac   Injectable 30 milliGRAM(s) IV Push every 6 hours  lactated ringers. 1000 milliLiter(s) (150 mL/Hr) IV Continuous <Continuous>  ondansetron Injectable 4 milliGRAM(s) IV Push every 6 hours  pantoprazole  Injectable 40 milliGRAM(s) IV Push daily    PRN  oxyCODONE    Solution 5 milliGRAM(s) Oral every 6 hours PRN Severe Pain (7 - 10)  simethicone 80 milliGRAM(s) Chew every 8 hours PRN Indigestion      Labs:  142  |  106  |  5<L>  ----------------------------<  109<H>    (11-21)  4.7   |  24  |  0.50          Ca    9.7      11-21  Mg    2.4  Phos  2.9          Urinalysis Basic - ( 21 Nov 2023 06:04 )    Color: x / Appearance: x / SG: x / pH: x  Gluc: 109 mg/dL / Ketone: x  / Bili: x / Urobili: x   Blood: x / Protein: x / Nitrite: x   Leuk Esterase: x / RBC: x / WBC x   Sq Epi: x / Non Sq Epi: x / Bacteria: x    Physical Exam:  General: NAD  Respiratory: respirations non labored  Abdominal: soft, nontender, nondistended. Dressings c/d/i with very minimal strikethrough.  Extremities: FROM, warm  Neurological: A+Ox3

## 2023-11-22 NOTE — PROGRESS NOTE ADULT - SUBJECTIVE AND OBJECTIVE BOX
Post Op Day#: 1    Subjective: "Discomfort upper abdomen, no N/V"    Objective: No acute events overnight, improved abdominal pain, some bloating and fullness after drinking liquid however no N/V. Continuous pulse oximetry at bedside functioning, v/s stable, afebrile. HR in the 50's, denies sob, no chest pain. Ambulated around the unit with assistance. Labs within acceptable range. Tolerating bariatric clear liquid diet and voiding as expected.                                               Vital Signs Last 24 Hrs  T(C): 36.9 (22 Nov 2023 08:23), Max: 37.2 (21 Nov 2023 20:48)  T(F): 98.4 (22 Nov 2023 08:23), Max: 98.9 (21 Nov 2023 20:48)  HR: 53 (22 Nov 2023 08:23) (48 - 63)  BP: 132/66 (22 Nov 2023 08:23) (121/69 - 135/64)  BP(mean): --  RR: 18 (22 Nov 2023 08:23) (18 - 18)  SpO2: 100% (22 Nov 2023 08:23) (98% - 100%)    Parameters below as of 22 Nov 2023 08:23  Patient On (Oxygen Delivery Method): room air                                                   I&O's Summary    21 Nov 2023 07:01  -  22 Nov 2023 07:00  --------------------------------------------------------  IN: 2950 mL / OUT: 1550 mL / NET: 1400 mL                                                                          10.7   7.44  )-----------( 264      ( 22 Nov 2023 07:50 )             34.6                                                 11-22    141  |  106  |  6<L>  ----------------------------<  73  3.7   |  25  |  0.51    Ca    9.7      22 Nov 2023 07:50  Phos  2.4     11-22  Mg     2.0     11-22      acetaminophen   Oral Liquid .. 650 milliGRAM(s) Oral every 6 hours  heparin   Injectable 5000 Unit(s) SubCutaneous every 8 hours  ketorolac   Injectable 30 milliGRAM(s) IV Push every 6 hours  lactated ringers. 1000 milliLiter(s) IV Continuous <Continuous>  ondansetron Injectable 4 milliGRAM(s) IV Push every 6 hours  oxyCODONE    Solution 5 milliGRAM(s) Oral every 6 hours PRN  pantoprazole  Injectable 40 milliGRAM(s) IV Push daily  simethicone 80 milliGRAM(s) Chew every 8 hours PRN  sucralfate suspension 1 Gram(s) Oral four times a day      Physical Exam:         Lungs:  clear breath sounds b/l       Heart:  Regular rate & rhythm       Abdomen:  Soft, non-distended.  Scopes sites clean, dry and intact. + bs, - flatus, no rebound or guarding       Skin:  intact, pannus w/o rash       Extremities: + pulses, no edema, no calf tenderness, negative steve's     VTE Extended Risk Assessment Scores             [facility]  VTE Predicted RISK: low  ( <1%),  ZULEYMA VTE Risk 6 points. (high)      Assessment and Plan: 58 year old obese female with GERD S/P Sleeve (2014). On 11/20/2023 she underwent Lap Conversion to RY Gastric, POD 2 with Epigastric discomfort     - Bariatric Clear diet today then protocol derived staged meal progression supervised by RD in outpatient setting  - DVT/GI prophylaxis, Incentive spirometry  - Ambulate as tolerated  - Procedure specific education including postop complications, medications and side effects, diet, vitamins and VTE prevention. Written materials given  - Medication reviewed and reconciled, Bariatric meds obtained from [facility] prior to d/c  - D/C home Bariatric 8-Point d/c criteria met  - Follow up with Dr Galvez in 7-10 days, Dietitian and PMD in 30 days.      Talia Miller, YARELI, ANP  [phone] Post Op Day#: 1    Subjective: "Discomfort upper abdomen, no N/V"    Objective: No acute events overnight, improved abdominal pain, some bloating and fullness after drinking liquid however no N/V. Continuous pulse oximetry at bedside functioning, v/s stable, afebrile. HR in the 50's, denies sob, no chest pain. Ambulated around the unit with assistance. Labs within acceptable range. Tolerating bariatric clear liquid diet and voiding as expected.                                               Vital Signs Last 24 Hrs  T(C): 36.9 (22 Nov 2023 08:23), Max: 37.2 (21 Nov 2023 20:48)  T(F): 98.4 (22 Nov 2023 08:23), Max: 98.9 (21 Nov 2023 20:48)  HR: 53 (22 Nov 2023 08:23) (48 - 63)  BP: 132/66 (22 Nov 2023 08:23) (121/69 - 135/64)  BP(mean): --  RR: 18 (22 Nov 2023 08:23) (18 - 18)  SpO2: 100% (22 Nov 2023 08:23) (98% - 100%)    Parameters below as of 22 Nov 2023 08:23  Patient On (Oxygen Delivery Method): room air                                                   I&O's Summary    21 Nov 2023 07:01  -  22 Nov 2023 07:00  --------------------------------------------------------  IN: 2950 mL / OUT: 1550 mL / NET: 1400 mL                                                                          10.7   7.44  )-----------( 264      ( 22 Nov 2023 07:50 )             34.6                                                 11-22    141  |  106  |  6<L>  ----------------------------<  73  3.7   |  25  |  0.51    Ca    9.7      22 Nov 2023 07:50  Phos  2.4     11-22  Mg     2.0     11-22      acetaminophen   Oral Liquid .. 650 milliGRAM(s) Oral every 6 hours  heparin   Injectable 5000 Unit(s) SubCutaneous every 8 hours  ketorolac   Injectable 30 milliGRAM(s) IV Push every 6 hours  lactated ringers. 1000 milliLiter(s) IV Continuous <Continuous>  ondansetron Injectable 4 milliGRAM(s) IV Push every 6 hours  oxyCODONE    Solution 5 milliGRAM(s) Oral every 6 hours PRN  pantoprazole  Injectable 40 milliGRAM(s) IV Push daily  simethicone 80 milliGRAM(s) Chew every 8 hours PRN  sucralfate suspension 1 Gram(s) Oral four times a day      Physical Exam:         Lungs:  clear breath sounds b/l       Heart:  Regular rate & rhythm       Abdomen:  Soft, non-distended.  Scopes sites clean, dry and intact. + bs, - flatus, no rebound or guarding       Skin:  intact, pannus w/o rash       Extremities: + pulses, no edema, no calf tenderness, negative steve's     VTE Extended Risk Assessment Scores             [facility]  VTE Predicted RISK: low  ( <1%),  CAPRNI VTE Risk 6 points. (high)      Assessment and Plan: 58 year old obese female with GERD S/P Sleeve (2014). On 11/20/2023 she underwent Lap Conversion to RY Gastric, POD 2 with Epigastric discomfort     - Bariatric Clear diet today then protocol derived staged meal progression supervised by RD in outpatient setting  - DVT/GI prophylaxis, Incentive spirometry  - Ambulate as tolerated  - Procedure specific education including postop complications, medications and side effects, diet, vitamins and VTE prevention. Written materials given  - Medication reviewed and reconciled, Bariatric meds obtained from [facility] prior to d/c  - D/C home Bariatric 8-Point d/c criteria met  - Follow up with Dr Galvez in 7-10 days, Dietitian and PMD in 30 days.      Talia Miller, YARELI, ANP  [phone]

## 2023-11-22 NOTE — PROGRESS NOTE ADULT - ATTENDING COMMENTS
I also reviewed the operative findings and data with the patient and staff    D/W patient, RN, residents and Fellow    Doing well, POD# 1 s/p RA-HH repair and conversion of LVSG to RYGB.  Continue postop protocol.  Dietary changes reinforced.  Home when meets d/c criteria.  f/u appointment confirmed.
POD 2 s/p sleeve to bypass, yesterday evening with some new pain with drinking, specifically protein shakes.  States broth and water go down better, but still pain.  Otherwise doing well.  OOB.  Ambulating.  Abd soft, appropriately tender, non distended.  Labs, vitals reviewed.     - cont jay clears  - carafate, simethicone  - pain control as needed  - dvt ppx  - possible dc home this afternoon if tolerating enough PO    Karen Gallagher MD

## 2023-11-23 VITALS
DIASTOLIC BLOOD PRESSURE: 64 MMHG | RESPIRATION RATE: 18 BRPM | SYSTOLIC BLOOD PRESSURE: 103 MMHG | TEMPERATURE: 98 F | OXYGEN SATURATION: 96 % | HEART RATE: 70 BPM

## 2023-11-23 RX ORDER — SUCRALFATE 1 G
10 TABLET ORAL
Refills: 0
Start: 2023-11-23

## 2023-11-23 RX ORDER — SUCRALFATE 1 G
10 TABLET ORAL
Qty: 280 | Refills: 0
Start: 2023-11-23 | End: 2023-11-29

## 2023-11-30 PROBLEM — Z09 POSTOPERATIVE EXAMINATION: Status: ACTIVE | Noted: 2023-11-30

## 2023-12-07 ENCOUNTER — APPOINTMENT (OUTPATIENT)
Dept: SURGERY | Facility: CLINIC | Age: 58
End: 2023-12-07
Payer: COMMERCIAL

## 2023-12-07 VITALS
HEIGHT: 68 IN | DIASTOLIC BLOOD PRESSURE: 79 MMHG | TEMPERATURE: 97.1 F | RESPIRATION RATE: 17 BRPM | SYSTOLIC BLOOD PRESSURE: 119 MMHG | WEIGHT: 191 LBS | BODY MASS INDEX: 28.95 KG/M2 | HEART RATE: 76 BPM | OXYGEN SATURATION: 99 %

## 2023-12-07 DIAGNOSIS — Z09 ENCOUNTER FOR FOLLOW-UP EXAMINATION AFTER COMPLETED TREATMENT FOR CONDITIONS OTHER THAN MALIGNANT NEOPLASM: ICD-10-CM

## 2023-12-07 PROCEDURE — 99024 POSTOP FOLLOW-UP VISIT: CPT

## 2023-12-11 ENCOUNTER — NON-APPOINTMENT (OUTPATIENT)
Age: 58
End: 2023-12-11

## 2023-12-12 ENCOUNTER — APPOINTMENT (OUTPATIENT)
Dept: SURGERY | Facility: CLINIC | Age: 58
End: 2023-12-12
Payer: COMMERCIAL

## 2023-12-12 PROCEDURE — 97802 MEDICAL NUTRITION INDIV IN: CPT

## 2023-12-13 LAB
25(OH)D3 SERPL-MCNC: 40.7 NG/ML
ALBUMIN SERPL ELPH-MCNC: 4.2 G/DL
ALP BLD-CCNC: 61 U/L
ALT SERPL-CCNC: 20 U/L
ANION GAP SERPL CALC-SCNC: 13 MMOL/L
AST SERPL-CCNC: 20 U/L
BASOPHILS # BLD AUTO: 0.02 K/UL
BASOPHILS NFR BLD AUTO: 0.3 %
BILIRUB SERPL-MCNC: 0.7 MG/DL
BUN SERPL-MCNC: 11 MG/DL
CALCIUM SERPL-MCNC: 9.9 MG/DL
CALCIUM SERPL-MCNC: 9.9 MG/DL
CHLORIDE SERPL-SCNC: 103 MMOL/L
CO2 SERPL-SCNC: 30 MMOL/L
CREAT SERPL-MCNC: 0.5 MG/DL
EGFR: 109 ML/MIN/1.73M2
EOSINOPHIL # BLD AUTO: 0.05 K/UL
EOSINOPHIL NFR BLD AUTO: 0.8 %
FERRITIN SERPL-MCNC: 123 NG/ML
FOLATE SERPL-MCNC: 19 NG/ML
GLUCOSE SERPL-MCNC: 90 MG/DL
HCT VFR BLD CALC: 38.4 %
HGB BLD-MCNC: 11.5 G/DL
IMM GRANULOCYTES NFR BLD AUTO: 0.2 %
IRON SATN MFR SERPL: 23 %
IRON SERPL-MCNC: 54 UG/DL
LYMPHOCYTES # BLD AUTO: 2.18 K/UL
LYMPHOCYTES NFR BLD AUTO: 34.3 %
MAN DIFF?: NORMAL
MCHC RBC-ENTMCNC: 26.6 PG
MCHC RBC-ENTMCNC: 29.9 GM/DL
MCV RBC AUTO: 88.9 FL
MONOCYTES # BLD AUTO: 0.52 K/UL
MONOCYTES NFR BLD AUTO: 8.2 %
NEUTROPHILS # BLD AUTO: 3.57 K/UL
NEUTROPHILS NFR BLD AUTO: 56.2 %
PARATHYROID HORMONE INTACT: 78 PG/ML
PLATELET # BLD AUTO: 324 K/UL
POTASSIUM SERPL-SCNC: 3.9 MMOL/L
PROT SERPL-MCNC: 6.9 G/DL
RBC # BLD: 4.32 M/UL
RBC # FLD: 14.7 %
SODIUM SERPL-SCNC: 146 MMOL/L
TIBC SERPL-MCNC: 235 UG/DL
UIBC SERPL-MCNC: 180 UG/DL
VIT B12 SERPL-MCNC: >2000 PG/ML
WBC # FLD AUTO: 6.35 K/UL

## 2023-12-18 LAB
VIT A SERPL-MCNC: 21 UG/DL
VIT B1 SERPL-MCNC: 82.2 NMOL/L

## 2023-12-20 LAB — COPPER SERPL-MCNC: 148 UG/DL

## 2023-12-21 ENCOUNTER — APPOINTMENT (OUTPATIENT)
Dept: SURGERY | Facility: CLINIC | Age: 58
End: 2023-12-21
Payer: COMMERCIAL

## 2023-12-21 VITALS
DIASTOLIC BLOOD PRESSURE: 77 MMHG | WEIGHT: 189.13 LBS | SYSTOLIC BLOOD PRESSURE: 129 MMHG | OXYGEN SATURATION: 98 % | HEIGHT: 68 IN | HEART RATE: 63 BPM | RESPIRATION RATE: 17 BRPM | TEMPERATURE: 96.4 F | BODY MASS INDEX: 28.66 KG/M2

## 2023-12-21 LAB — ZINC SERPL-MCNC: 85 UG/DL

## 2023-12-21 PROCEDURE — 99024 POSTOP FOLLOW-UP VISIT: CPT

## 2023-12-21 NOTE — REASON FOR VISIT
[Gastric By-pass] : gastric by-pass [de-identified] : 11/20/2023 [de-identified] : DOLV : 12/7/2023

## 2023-12-21 NOTE — PHYSICAL EXAM
[de-identified] : Anicteric [de-identified] : CTA [de-identified] : RRR [de-identified] : Soft [de-identified] : incisions healing well.

## 2023-12-21 NOTE — ASSESSMENT
[___ Months Post Op] : [unfilled] months [Previous Visit Weight: ___] : Previous visit weight: [unfilled] lbs [Today's Weight: ___] : Today's weight:[unfilled] lbs [___ Weeks Post Op] : [unfilled] weeks [de-identified] : Plan: Continue high-protein low-carb diet, advance as tolerated. Avoid sugar and starch; less than 25 g of carbohydrates per day. Dietary counseling provided Increase activities as tolerated blood work results discussed with patient by NP Continue vitamins daily Follow-up 1 month

## 2023-12-21 NOTE — HISTORY OF PRESENT ILLNESS
[Procedure: ___] : Procedure performed: [unfilled]  [Date of Surgery: ___] : Date of Surgery:   [unfilled] [Surgeon Name:   ___] : Surgeon Name: Dr. FITZGERALD [Pre-Op Weight ___] : Pre-op weight was [unfilled] lbs [___ Months Post Op] : [unfilled] months [Diabetes] : Diabetes [de-identified] : Patient doing well, eating high protein diet, low carb , ( pureed and soft) diet. Denies dysphagia or GERD symptoms. Drinking daily protein drink, 2 Gatorade and 16 OZ water. Increased physical activity, feels fatigue with activity but not as tired as before.. Taking daily vitamins.   Patient says she has blurry vision after the surgery. She was seen by PCP recently and advised ophthalmology consult, she will schedule appointment.  Reflux completely gone.  [Hypertension] : no Hypertension [Sleep Apnea] : no Sleep Apnea [GERD] : no GERD [Hyperlipidemia] : no Hyperlipidemia

## 2024-01-18 NOTE — HISTORY OF PRESENT ILLNESS
[Procedure: ___] : Procedure performed: [unfilled]  [Date of Surgery: ___] : Date of Surgery:   [unfilled] [Surgeon Name:   ___] : Surgeon Name: Dr. FITZGERALD [Pre-Op Weight ___] : Pre-op weight was [unfilled] lbs [___ Months Post Op] : [unfilled] months [Diabetes] : Diabetes [Hypertension] : no Hypertension [Sleep Apnea] : no Sleep Apnea [Hyperlipidemia] : no Hyperlipidemia

## 2024-01-18 NOTE — REASON FOR VISIT
[Gastric By-pass] : gastric by-pass [de-identified] : 11/20/2023 [de-identified] : DOLV : 12/21/2023

## 2024-01-25 ENCOUNTER — APPOINTMENT (OUTPATIENT)
Dept: SURGERY | Facility: CLINIC | Age: 59
End: 2024-01-25
Payer: COMMERCIAL

## 2024-01-25 VITALS
DIASTOLIC BLOOD PRESSURE: 82 MMHG | BODY MASS INDEX: 27.51 KG/M2 | RESPIRATION RATE: 16 BRPM | HEART RATE: 50 BPM | WEIGHT: 181.5 LBS | OXYGEN SATURATION: 98 % | SYSTOLIC BLOOD PRESSURE: 127 MMHG | TEMPERATURE: 97.2 F | HEIGHT: 68 IN

## 2024-01-25 PROCEDURE — 99024 POSTOP FOLLOW-UP VISIT: CPT

## 2024-02-22 ENCOUNTER — APPOINTMENT (OUTPATIENT)
Dept: SURGERY | Facility: CLINIC | Age: 59
End: 2024-02-22
Payer: COMMERCIAL

## 2024-02-22 ENCOUNTER — NON-APPOINTMENT (OUTPATIENT)
Age: 59
End: 2024-02-22

## 2024-02-22 VITALS
RESPIRATION RATE: 16 BRPM | DIASTOLIC BLOOD PRESSURE: 71 MMHG | WEIGHT: 173.13 LBS | TEMPERATURE: 97.8 F | HEART RATE: 54 BPM | BODY MASS INDEX: 26.24 KG/M2 | OXYGEN SATURATION: 99 % | HEIGHT: 68 IN | SYSTOLIC BLOOD PRESSURE: 145 MMHG

## 2024-02-22 PROCEDURE — 99213 OFFICE O/P EST LOW 20 MIN: CPT

## 2024-02-22 NOTE — HISTORY OF PRESENT ILLNESS
[de-identified] : Today patient denies surgical incisional pain.  BMs regular once daily, gassy especially with broccoli.  Good appetite. Denies nausea, vomiting, fever or chills. Surgical incision well approximated.  Takes her vitamins daily.  Denies acid reflux.  Walking activity being done 1 hr weekly 2 times.  [Phase 4] : Phase 4 [Walking] : walking [Sleep Apnea] : no Sleep Apnea [Hypertension] : no Hypertension [GERD] : no GERD [Hyperlipidemia] : no Hyperlipidemia

## 2024-02-22 NOTE — ASSESSMENT
[Today's Weight: ___] : Today's weight:[unfilled] lbs [de-identified] : Plan: Continue high-protein low-carb diet Adjust p.o. intake based on food diary and gaseousness. Okay to take simethicone. Dietary counseling provided Increase activities as tolerated Check blood work Continue vitamins daily Follow-up 3 months

## 2024-02-22 NOTE — PHYSICAL EXAM
[de-identified] : Anecteric [de-identified] : WNL [de-identified] : Soft, non-tender [de-identified] : WNL

## 2024-02-22 NOTE — REVIEW OF SYSTEMS
[Joint Pain] : joint pain [Joint Stiffness] : joint stiffness [Muscle Pain] : muscle pain [Negative] : Heme/Lymph [Muscle Weakness] : no muscle weakness

## 2024-05-16 ENCOUNTER — APPOINTMENT (OUTPATIENT)
Dept: SURGERY | Facility: CLINIC | Age: 59
End: 2024-05-16
Payer: COMMERCIAL

## 2024-05-16 VITALS
WEIGHT: 168.5 LBS | SYSTOLIC BLOOD PRESSURE: 95 MMHG | OXYGEN SATURATION: 96 % | HEIGHT: 68 IN | RESPIRATION RATE: 16 BRPM | BODY MASS INDEX: 25.54 KG/M2 | DIASTOLIC BLOOD PRESSURE: 57 MMHG | TEMPERATURE: 97.3 F | HEART RATE: 52 BPM

## 2024-05-16 DIAGNOSIS — Z98.84 BARIATRIC SURGERY STATUS: ICD-10-CM

## 2024-05-16 PROCEDURE — 99213 OFFICE O/P EST LOW 20 MIN: CPT

## 2024-05-16 NOTE — REASON FOR VISIT
[Follow-Up Visit] : a follow-up visit for [S/P Bariatric Surgery] : s/p bariatric surgery No deformity or limitation of movement

## 2024-05-16 NOTE — REVIEW OF SYSTEMS
[Negative] : Allergic/Immunologic [Abdominal Pain] : abdominal pain [Diarrhea] : diarrhea [FreeTextEntry7] : Occasional abdominal pain with pressure

## 2024-05-16 NOTE — PLAN
[FreeTextEntry1] : Plan: Continue high-protein low-carb diet Avoid sugar and starch Dietary counseling provided activities as tolerated blood work reviewed with patient consistent with fatty liver Workup for abnormal LFTs per PMD Resume vitamins daily Follow-up 6 months

## 2024-05-16 NOTE — HISTORY OF PRESENT ILLNESS
[de-identified] : Dulce is a 57 y/o female here for a 3 month follow up. S/p Robot-assisted hiatal hernia repair and conversion to Aurelia-en-Y gastric bypass , Date of Surgery: 11/20/2023,   Today pt reports abdominal pain with pressure with certain foods.  Also has explosive diarrhea with baked goods.   Is tolerating regular diet. Drinks 132 oz of water along with occasional protein shake, teas.  Ambulating for 30-40 minutes activity are done.   Maintaining a high protein, low carb diet. Denies nausea/vomiting. Denies acid reflux. Denies fevers/chills.  Normal daily BM, sometimes diarrhea from her diet.  Patient had recent blood work which showed abnormal ALT/AST and indirect hyperbilirubinemia.  Per patient a right upper quadrant ultrasound was done which was normal.

## 2024-05-16 NOTE — ASSESSMENT
[FreeTextEntry1] : 6 months status post conversion of gastric sleeve to Aurelia-en-Y gastric bypass for reflux with abnormal liver function tests not consistent with biliary tract disease.

## 2024-05-16 NOTE — PHYSICAL EXAM
[Normal] : no JVD, no calf tenderness, venous stasis changes, varices [de-identified] : Normoactive bowel sounds, no hepatosplenomegaly, no masses, non-tender.

## 2024-08-07 ENCOUNTER — NON-APPOINTMENT (OUTPATIENT)
Age: 59
End: 2024-08-07

## 2024-08-07 NOTE — HISTORY OF PRESENT ILLNESS
[Procedure: ___] : Procedure performed: [unfilled]  [Date of Surgery: ___] : Date of Surgery:   [unfilled] [Surgeon Name:   ___] : Surgeon Name: Dr. FITZGERALD [Pre-Op Weight ___] : Pre-op weight was [unfilled] lbs [___ Months Post Op] : [unfilled] months [Phase 4] : Phase 4 [Walking] : walking [Diabetes] : Diabetes [de-identified] : Today patient denies surgical incisional pain.  BMs regular once daily, gassy especially with broccoli.  Good appetite. Denies nausea, vomiting, fever or chills. Surgical incision well approximated.  Takes her vitamins daily.  Denies acid reflux.  Walking activity being done 1 hr weekly 2 times.  [Hypertension] : no Hypertension [Sleep Apnea] : no Sleep Apnea [GERD] : no GERD [Hyperlipidemia] : no Hyperlipidemia

## 2024-08-07 NOTE — PHYSICAL EXAM
[de-identified] : Anecteric [de-identified] : WNL [de-identified] : WNL [de-identified] : Soft, non-tender

## 2024-08-07 NOTE — ASSESSMENT
[___ Months Post Op] : [unfilled] months [Previous Visit Weight: ___] : Previous visit weight: [unfilled] lbs [de-identified] : Plan: Continue high-protein low-carb diet Adjust p.o. intake based on food diary and gaseousness. Okay to take simethicone. Dietary counseling provided Increase activities as tolerated Check blood work Continue vitamins daily Follow-up 3 months

## 2024-08-08 NOTE — ASSESSMENT
[de-identified] : Plan: Continue high-protein low-carb diet Adjust p.o. intake based on food diary and gaseousness. Okay to take simethicone. Dietary counseling provided Increase activities as tolerated Check blood work Continue vitamins daily Follow-up 3 months

## 2024-08-08 NOTE — HISTORY OF PRESENT ILLNESS
[de-identified] : Today patient denies surgical incisional pain.  BMs regular once daily, gassy especially with broccoli.  Good appetite. Denies nausea, vomiting, fever or chills. Surgical incision well approximated.  Takes her vitamins daily.  Denies acid reflux.  Walking activity being done 1 hr weekly 2 times.  [Hypertension] : no Hypertension [Sleep Apnea] : no Sleep Apnea [GERD] : no GERD [Hyperlipidemia] : no Hyperlipidemia

## 2024-08-08 NOTE — PHYSICAL EXAM
[de-identified] : Anecteric [de-identified] : WNL [de-identified] : WNL [de-identified] : Soft, non-tender

## 2024-08-15 ENCOUNTER — APPOINTMENT (OUTPATIENT)
Dept: SURGERY | Facility: CLINIC | Age: 59
End: 2024-08-15

## 2024-08-21 NOTE — H&P PST ADULT - NSWEIGHTCALCTOOLDRUG_GEN_A_CORE
Pt lives in an apartment with daughter, with 1 flight of stairs to reach living space.  PTA pt independent with ambulation and ADLs, has a cane that she uses outside.  used

## 2024-08-29 ENCOUNTER — APPOINTMENT (OUTPATIENT)
Dept: SURGERY | Facility: CLINIC | Age: 59
End: 2024-08-29
Payer: COMMERCIAL

## 2024-08-29 VITALS
OXYGEN SATURATION: 98 % | TEMPERATURE: 98 F | RESPIRATION RATE: 16 BRPM | WEIGHT: 170.31 LBS | SYSTOLIC BLOOD PRESSURE: 140 MMHG | HEIGHT: 68 IN | DIASTOLIC BLOOD PRESSURE: 69 MMHG | BODY MASS INDEX: 25.81 KG/M2 | HEART RATE: 59 BPM

## 2024-08-29 DIAGNOSIS — R63.5 ABNORMAL WEIGHT GAIN: ICD-10-CM

## 2024-08-29 DIAGNOSIS — Z90.3 ACQUIRED ABSENCE OF STOMACH [PART OF]: ICD-10-CM

## 2024-08-29 PROCEDURE — 99213 OFFICE O/P EST LOW 20 MIN: CPT

## 2024-08-29 NOTE — HISTORY OF PRESENT ILLNESS
[de-identified] : Today patient denies surgical incisional pain.  BMs regular once daily, gassy especially with broccoli.  Good appetite. Denies nausea, vomiting, fever or chills. Surgical incision well approximated.  Takes her vitamins daily.  Denies acid reflux.  Just recovered from a broken foot so was very sedentary - can start walking again now.  [Hypertension] : no Hypertension [Sleep Apnea] : no Sleep Apnea [GERD] : no GERD [Hyperlipidemia] : no Hyperlipidemia

## 2024-08-29 NOTE — ASSESSMENT
[Today's Weight: ___] : Today's weight:[unfilled] lbs [de-identified] : Plan: Continue high-protein low-carb diet Consider 2 weeks pouch reset Okay to take simethicone. Dietary counseling provided Increase activities as tolerated and allowed by injury Will review Blood work Continue vitamins daily Follow-up 3 months

## 2024-08-29 NOTE — PHYSICAL EXAM
[de-identified] : Anecteric [de-identified] : WNL [de-identified] : WNL [de-identified] : Soft, non-tender

## 2024-08-29 NOTE — HISTORY OF PRESENT ILLNESS
[de-identified] : Today patient denies surgical incisional pain.  BMs regular once daily, gassy especially with broccoli.  Good appetite. Denies nausea, vomiting, fever or chills. Surgical incision well approximated.  Takes her vitamins daily.  Denies acid reflux.  Just recovered from a broken foot so was very sedentary - can start walking again now.  [Hypertension] : no Hypertension [Sleep Apnea] : no Sleep Apnea [GERD] : no GERD [Hyperlipidemia] : no Hyperlipidemia

## 2024-08-29 NOTE — PHYSICAL EXAM
[de-identified] : Anecteric [de-identified] : WNL [de-identified] : WNL [de-identified] : Soft, non-tender

## 2024-08-29 NOTE — ASSESSMENT
[Today's Weight: ___] : Today's weight:[unfilled] lbs [de-identified] : Plan: Continue high-protein low-carb diet Consider 2 weeks pouch reset Okay to take simethicone. Dietary counseling provided Increase activities as tolerated and allowed by injury Will review Blood work Continue vitamins daily Follow-up 3 months

## 2024-10-24 ENCOUNTER — OUTPATIENT (OUTPATIENT)
Dept: OUTPATIENT SERVICES | Facility: HOSPITAL | Age: 59
LOS: 1 days | End: 2024-10-24
Payer: COMMERCIAL

## 2024-10-24 VITALS
RESPIRATION RATE: 14 BRPM | DIASTOLIC BLOOD PRESSURE: 69 MMHG | OXYGEN SATURATION: 98 % | TEMPERATURE: 97 F | WEIGHT: 173.06 LBS | HEIGHT: 68 IN | HEART RATE: 55 BPM | SYSTOLIC BLOOD PRESSURE: 146 MMHG

## 2024-10-24 DIAGNOSIS — Z98.89 OTHER SPECIFIED POSTPROCEDURAL STATES: Chronic | ICD-10-CM

## 2024-10-24 DIAGNOSIS — Z01.818 ENCOUNTER FOR OTHER PREPROCEDURAL EXAMINATION: ICD-10-CM

## 2024-10-24 DIAGNOSIS — M67.814 OTHER SPECIFIED DISORDERS OF TENDON, LEFT SHOULDER: ICD-10-CM

## 2024-10-24 DIAGNOSIS — Z98.890 OTHER SPECIFIED POSTPROCEDURAL STATES: Chronic | ICD-10-CM

## 2024-10-24 DIAGNOSIS — Z98.84 BARIATRIC SURGERY STATUS: Chronic | ICD-10-CM

## 2024-10-24 DIAGNOSIS — D25.9 LEIOMYOMA OF UTERUS, UNSPECIFIED: Chronic | ICD-10-CM

## 2024-10-24 LAB
ANION GAP SERPL CALC-SCNC: 4 MMOL/L — LOW (ref 5–17)
BUN SERPL-MCNC: 8 MG/DL — SIGNIFICANT CHANGE UP (ref 7–23)
CALCIUM SERPL-MCNC: 9.3 MG/DL — SIGNIFICANT CHANGE UP (ref 8.5–10.1)
CHLORIDE SERPL-SCNC: 111 MMOL/L — HIGH (ref 96–108)
CO2 SERPL-SCNC: 29 MMOL/L — SIGNIFICANT CHANGE UP (ref 22–31)
CREAT SERPL-MCNC: 0.42 MG/DL — LOW (ref 0.5–1.3)
EGFR: 113 ML/MIN/1.73M2 — SIGNIFICANT CHANGE UP
GLUCOSE SERPL-MCNC: 80 MG/DL — SIGNIFICANT CHANGE UP (ref 70–99)
HCT VFR BLD CALC: 37.7 % — SIGNIFICANT CHANGE UP (ref 34.5–45)
HGB BLD-MCNC: 11.7 G/DL — SIGNIFICANT CHANGE UP (ref 11.5–15.5)
MCHC RBC-ENTMCNC: 27.7 PG — SIGNIFICANT CHANGE UP (ref 27–34)
MCHC RBC-ENTMCNC: 31 GM/DL — LOW (ref 32–36)
MCV RBC AUTO: 89.1 FL — SIGNIFICANT CHANGE UP (ref 80–100)
NRBC # BLD: 0 /100 WBCS — SIGNIFICANT CHANGE UP (ref 0–0)
PLATELET # BLD AUTO: 295 K/UL — SIGNIFICANT CHANGE UP (ref 150–400)
POTASSIUM SERPL-MCNC: 3.5 MMOL/L — SIGNIFICANT CHANGE UP (ref 3.5–5.3)
POTASSIUM SERPL-SCNC: 3.5 MMOL/L — SIGNIFICANT CHANGE UP (ref 3.5–5.3)
RBC # BLD: 4.23 M/UL — SIGNIFICANT CHANGE UP (ref 3.8–5.2)
RBC # FLD: 13.2 % — SIGNIFICANT CHANGE UP (ref 10.3–14.5)
SODIUM SERPL-SCNC: 144 MMOL/L — SIGNIFICANT CHANGE UP (ref 135–145)
WBC # BLD: 6.2 K/UL — SIGNIFICANT CHANGE UP (ref 3.8–10.5)
WBC # FLD AUTO: 6.2 K/UL — SIGNIFICANT CHANGE UP (ref 3.8–10.5)

## 2024-10-24 PROCEDURE — 80048 BASIC METABOLIC PNL TOTAL CA: CPT

## 2024-10-24 PROCEDURE — 93005 ELECTROCARDIOGRAM TRACING: CPT

## 2024-10-24 PROCEDURE — 36415 COLL VENOUS BLD VENIPUNCTURE: CPT

## 2024-10-24 PROCEDURE — G0463: CPT

## 2024-10-24 PROCEDURE — 93010 ELECTROCARDIOGRAM REPORT: CPT

## 2024-10-24 PROCEDURE — 85027 COMPLETE CBC AUTOMATED: CPT

## 2024-10-24 NOTE — H&P PST ADULT - GASTROINTESTINAL COMMENTS
Notes loose stools since having had weight loss surgery however notes this is baseline for her since that time

## 2024-10-24 NOTE — H&P PST ADULT - OTHER CARE PROVIDERS
PCP- Dr Sylvia Montaño  / Cardiologist Dr Tod Murguia however pt notes since this is a workers comp claim she was seen by Dr Andrea Quintana [phone]

## 2024-10-24 NOTE — H&P PST ADULT - NSICDXFAMILYHX_GEN_ALL_CORE_FT
FAMILY HISTORY:  Father  Still living? No  Family history of alcoholism, Age at diagnosis: Age Unknown  FH: CABG (coronary artery bypass surgery), Age at diagnosis: Age Unknown    Mother  Still living? Yes, Estimated age: 81-90  Family history of hypertension, Age at diagnosis: Age Unknown

## 2024-10-24 NOTE — H&P PST ADULT - NSICDXPASTMEDICALHX_GEN_ALL_CORE_FT
PAST MEDICAL HISTORY:  Bulging lumbar disc     Environmental allergies mucinex prn    Herniated cervical disc     Hiatal hernia     Hyperlipidemia     Latex allergy     Obesity     JUAQUIN on CPAP     Other specified disorders of tendon, left shoulder     Type 2 diabetes mellitus

## 2024-10-24 NOTE — H&P PST ADULT - LAST ECHOCARDIOGRAM
Notes prior H/O ECHO prior to Bariatric Surgery with Dr Tod Murguia- test results sent to clearing doctor for review

## 2024-10-24 NOTE — H&P PST ADULT - NSICDXPASTSURGICALHX_GEN_ALL_CORE_FT
PAST SURGICAL HISTORY:  H/O bariatric surgery gastric SLEEVE    S/P D&C (status post dilation and curettage) TOP A x1    S/P D&C (status post dilation and curettage) myomectomy 2008    S/P myomectomy abdominal 1989/ 2004    Status post embolization of uterine artery
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
## 2024-10-24 NOTE — H&P PST ADULT - RX
Notes prior H/O JUAQUIN ON CPAP however since bariatric surgery X 2 has lost total of 150LBS and no longer uses CPAP machine however never went for a follow up sleep study just notes SX resolved following weight loss

## 2024-10-24 NOTE — H&P PST ADULT - MUSCULOSKELETAL
details… Decreased ROM and strength to left shoulder - pain on palpation exam/decreased ROM/decreased ROM due to pain

## 2024-10-24 NOTE — H&P PST ADULT - PROBLEM SELECTOR PLAN 1
PST labs; CBC, BMP, HgB A1C, EKG. Pt notes she was seen for clearance with workers comp Dr Andrea Quintana. Will fax over PST results for review and medical clearance. Pt instructed to stop any NSAIDS/Herbal Supplements one week prior to procedure. May take Tylenol if needed for any pain between now and procedure. No medications needed morning of procedure. Pre-op instructions as well as pre-op wash instructions given to pt with understanding verbalized. All questions addressed with pt prior to her leaving the PST department today.

## 2024-10-24 NOTE — H&P PST ADULT - MAMMOGRAM, RESULTS OF LAST, PROFILE
Based on the patients symptoms and/or clinical findings the procedure is clinically indicated and should not be delayed due to Covid 19.
WNL

## 2024-10-24 NOTE — H&P PST ADULT - LAST STRESS TEST
Notes prior H/O Stress test prior to Bariatric Surgery with Dr Tod Murguia- test results sent to clearing doctor for review

## 2024-10-24 NOTE — H&P PST ADULT - ASSESSMENT
59 year old female PMH  HLD (no current medications). H/O JUAQUIN/Type 2 DM ( however since bariatric sleeve in 2014 then gastric bypass in 2023 pt has lost 150lbs and is no longer on Metformin  and no longer uses CPAP machine),  Hiatal Hernia, Latex Allergy, Environmental Allergies, Herniated Cervical Disc, Bulging Lumbar Disc, COVID-19; H/O D&C, Myomectomy, Uterine Embolization;  now with  Other Specified Disorders Tendon LEFT Shoulder; presents today for PST prior to LEFT Shoulder Arthroscopy - Debridement with Dr Camden Ogden on 11/6/2024.

## 2024-11-06 ENCOUNTER — TRANSCRIPTION ENCOUNTER (OUTPATIENT)
Age: 59
End: 2024-11-06

## 2024-11-06 ENCOUNTER — OUTPATIENT (OUTPATIENT)
Dept: OUTPATIENT SERVICES | Facility: HOSPITAL | Age: 59
LOS: 1 days | End: 2024-11-06
Payer: COMMERCIAL

## 2024-11-06 VITALS
RESPIRATION RATE: 18 BRPM | HEART RATE: 71 BPM | OXYGEN SATURATION: 100 % | SYSTOLIC BLOOD PRESSURE: 140 MMHG | DIASTOLIC BLOOD PRESSURE: 76 MMHG

## 2024-11-06 VITALS
OXYGEN SATURATION: 99 % | HEIGHT: 68 IN | SYSTOLIC BLOOD PRESSURE: 113 MMHG | HEART RATE: 50 BPM | WEIGHT: 173.06 LBS | DIASTOLIC BLOOD PRESSURE: 60 MMHG | RESPIRATION RATE: 14 BRPM | TEMPERATURE: 98 F

## 2024-11-06 DIAGNOSIS — Z98.84 BARIATRIC SURGERY STATUS: Chronic | ICD-10-CM

## 2024-11-06 DIAGNOSIS — Z98.89 OTHER SPECIFIED POSTPROCEDURAL STATES: Chronic | ICD-10-CM

## 2024-11-06 DIAGNOSIS — Z98.890 OTHER SPECIFIED POSTPROCEDURAL STATES: Chronic | ICD-10-CM

## 2024-11-06 DIAGNOSIS — M67.814 OTHER SPECIFIED DISORDERS OF TENDON, LEFT SHOULDER: ICD-10-CM

## 2024-11-06 PROCEDURE — C1713: CPT

## 2024-11-06 PROCEDURE — C9399: CPT

## 2024-11-06 PROCEDURE — 88304 TISSUE EXAM BY PATHOLOGIST: CPT

## 2024-11-06 PROCEDURE — 88304 TISSUE EXAM BY PATHOLOGIST: CPT | Mod: 26

## 2024-11-06 PROCEDURE — 29826 SHO ARTHRS SRG DECOMPRESSION: CPT | Mod: LT

## 2024-11-06 PROCEDURE — 29823 SHO ARTHRS SRG XTNSV DBRDMT: CPT | Mod: LT

## 2024-11-06 PROCEDURE — 23430 REPAIR BICEPS TENDON: CPT | Mod: LT

## 2024-11-06 DEVICE — ANCHOR ARTHROSCOPIC DEL SYS ADVANCED: Type: IMPLANTABLE DEVICE | Site: LEFT | Status: FUNCTIONAL

## 2024-11-06 DEVICE — ARTHREX FIBERTAK BICEPS IMPLANT SYSTEM: Type: IMPLANTABLE DEVICE | Site: LEFT | Status: FUNCTIONAL

## 2024-11-06 RX ORDER — CEFAZOLIN SODIUM 1 G
2000 VIAL (EA) INJECTION ONCE
Refills: 0 | Status: COMPLETED | OUTPATIENT
Start: 2024-11-06 | End: 2024-11-06

## 2024-11-06 RX ORDER — OXYCODONE HYDROCHLORIDE 30 MG/1
1 TABLET ORAL
Qty: 20 | Refills: 0
Start: 2024-11-06 | End: 2024-11-10

## 2024-11-06 RX ORDER — ONDANSETRON HYDROCHLORIDE 2 MG/ML
1 INJECTION, SOLUTION INTRAMUSCULAR; INTRAVENOUS
Qty: 20 | Refills: 0
Start: 2024-11-06 | End: 2024-11-10

## 2024-11-06 RX ORDER — MORPHINE SULFATE 30 MG/1
10 TABLET, EXTENDED RELEASE ORAL ONCE
Refills: 0 | Status: DISCONTINUED | OUTPATIENT
Start: 2024-11-06 | End: 2024-11-06

## 2024-11-06 RX ORDER — DOCUSATE SODIUM 100 MG
1 CAPSULE ORAL
Qty: 20 | Refills: 0
Start: 2024-11-06 | End: 2024-11-10

## 2024-11-06 RX ORDER — NALOXONE HYDROCHLORIDE 1 MG/ML
1 INJECTION, SOLUTION INTRAMUSCULAR; INTRAVENOUS; SUBCUTANEOUS
Qty: 4 | Refills: 0
Start: 2024-11-06 | End: 2024-11-06

## 2024-11-06 RX ORDER — ACETAMINOPHEN 500 MG
5 TABLET ORAL
Refills: 0 | DISCHARGE

## 2024-11-06 RX ORDER — HYDROMORPHONE HCL/0.9% NACL/PF 6 MG/30 ML
0.5 PATIENT CONTROLLED ANALGESIA SYRINGE INTRAVENOUS
Refills: 0 | Status: DISCONTINUED | OUTPATIENT
Start: 2024-11-06 | End: 2024-11-06

## 2024-11-06 RX ORDER — ONDANSETRON HYDROCHLORIDE 2 MG/ML
4 INJECTION, SOLUTION INTRAMUSCULAR; INTRAVENOUS ONCE
Refills: 0 | Status: DISCONTINUED | OUTPATIENT
Start: 2024-11-06 | End: 2024-11-06

## 2024-11-06 RX ORDER — CYCLOBENZAPRINE HYDROCHLORIDE 30 MG/1
1 CAPSULE, EXTENDED RELEASE ORAL
Refills: 0 | DISCHARGE

## 2024-11-06 RX ORDER — OXYCODONE HYDROCHLORIDE 30 MG/1
5 TABLET ORAL ONCE
Refills: 0 | Status: DISCONTINUED | OUTPATIENT
Start: 2024-11-06 | End: 2024-11-06

## 2024-11-06 RX ADMIN — Medication 60 MILLILITER(S): at 06:57

## 2024-11-06 RX ADMIN — Medication 0.5 MILLIGRAM(S): at 10:30

## 2024-11-06 RX ADMIN — Medication 0.5 MILLIGRAM(S): at 10:52

## 2024-11-06 RX ADMIN — Medication 0.5 MILLIGRAM(S): at 10:15

## 2024-11-06 RX ADMIN — Medication 0.5 MILLIGRAM(S): at 11:07

## 2024-11-06 RX ADMIN — Medication 75 MILLILITER(S): at 10:15

## 2024-11-06 NOTE — ASU DISCHARGE PLAN (ADULT/PEDIATRIC) - PROCEDURE
L shoulder arthroscopy L shoulder arthroscopy debridement, subacromial decompression, bicep tenodesis
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
## 2024-11-06 NOTE — H&P ADULT - NSHPPHYSICALEXAM_GEN_ALL_CORE
LUE:  skin intact  silt c5-t1  ain/pin/r/u/m intact  FF/Abd 140, ER 30  painful arc  +greater tuberosity tenderness  +neer/hawkin  +bicipital groove tenderness

## 2024-11-06 NOTE — H&P ADULT - ASSESSMENT
Patient is 59yF with left shoulder rotator cuff partial tears  -plan for OR today  -risks, benefits, alternative discussed. Consented for decompression, debridement, bicep tenodesis and rotator cuff repair  -rehab and recovery reviewed. Explained length is dependent on procedures performed  -patient is medically optimized  -proceed with procedure as planned

## 2024-11-06 NOTE — ASU DISCHARGE PLAN (ADULT/PEDIATRIC) - NS MD DC FALL RISK RISK
For information on Fall & Injury Prevention, visit: [email or web address] OR  [email or web address] OR  https://www.cdc.gov/steadi/patient.html

## 2024-11-06 NOTE — BRIEF OPERATIVE NOTE - NSICDXBRIEFPROCEDURE_GEN_ALL_CORE_FT
PROCEDURES:  Arthroscopy of left shoulder with biceps tenodesis 06-Nov-2024 12:37:02 W/ Subacromial Decompression Germain Molina

## 2024-11-06 NOTE — BRIEF OPERATIVE NOTE - NSICDXBRIEFPOSTOP_GEN_ALL_CORE_FT
POST-OP DIAGNOSIS:  Partial tear of left rotator cuff 06-Nov-2024 12:37:55  Germain Molina  Superior labrum anterior-to-posterior (SLAP) tear of left shoulder 06-Nov-2024 12:38:00  Germain Molina

## 2024-11-06 NOTE — ASU DISCHARGE PLAN (ADULT/PEDIATRIC) - CARE PROVIDER_API CALL
Camden Ogden  Orthopaedic Surgery  [address] [place] [address]  Phone: [phone]  Fax: [phone]  Follow Up Time:

## 2024-11-06 NOTE — ASU DISCHARGE PLAN (ADULT/PEDIATRIC) - FINANCIAL ASSISTANCE
[facility] provides services at a reduced cost to those who are determined to be eligible through [facility]’s financial assistance program. Information regarding [facility]’s financial assistance program can be found by going to [email or web address] or by calling [phone].

## 2024-11-06 NOTE — BRIEF OPERATIVE NOTE - NSICDXBRIEFPREOP_GEN_ALL_CORE_FT
PRE-OP DIAGNOSIS:  Partial tear of left rotator cuff 06-Nov-2024 12:37:46  Germain Molina  Superior labrum anterior-to-posterior (SLAP) tear of left shoulder 06-Nov-2024 12:37:31  Germain Molina

## 2024-11-21 ENCOUNTER — APPOINTMENT (OUTPATIENT)
Dept: SURGERY | Facility: CLINIC | Age: 59
End: 2024-11-21

## 2024-12-05 NOTE — ASU DISCHARGE PLAN (ADULT/PEDIATRIC) - ASU DC SPECIAL INSTRUCTIONSFT
-Remain in sling at all times  -Ice as needed  -tylenol 1000mg TID prn with oxycodone if needed  -PAUL VIRGEN  -Office will call with post op instructions tomorrow morning and schedule post op appt
no

## 2025-04-03 NOTE — ED ADULT NURSE NOTE - EXTENSIONS OF SELF_ADULT
SURVEY:    You may be receiving a survey from [facility] [employer] regarding your visit today.    Please complete the survey to enable us to provide the highest quality of care to you and your family.    If you cannot score us a very good (5 Stars) on any question, please call the office to discuss how we could have made your experience a very good one.    Thank you.    Clinical Care Team: MD Cristi Pacheco LPN              Triage: Yael Mcbride CMA              Clerical Team: Yael Soto    None

## 2025-06-19 ENCOUNTER — EMERGENCY (EMERGENCY)
Facility: HOSPITAL | Age: 60
LOS: 0 days | Discharge: HOME | End: 2025-06-19
Attending: STUDENT IN AN ORGANIZED HEALTH CARE EDUCATION/TRAINING PROGRAM
Payer: COMMERCIAL

## 2025-06-19 VITALS
RESPIRATION RATE: 19 BRPM | HEIGHT: 68 IN | WEIGHT: 179.9 LBS | OXYGEN SATURATION: 100 % | SYSTOLIC BLOOD PRESSURE: 142 MMHG | DIASTOLIC BLOOD PRESSURE: 85 MMHG | HEART RATE: 55 BPM | TEMPERATURE: 98 F

## 2025-06-19 VITALS
TEMPERATURE: 98 F | SYSTOLIC BLOOD PRESSURE: 125 MMHG | RESPIRATION RATE: 18 BRPM | OXYGEN SATURATION: 98 % | HEART RATE: 53 BPM | DIASTOLIC BLOOD PRESSURE: 78 MMHG

## 2025-06-19 DIAGNOSIS — Z98.89 OTHER SPECIFIED POSTPROCEDURAL STATES: Chronic | ICD-10-CM

## 2025-06-19 DIAGNOSIS — Z98.890 OTHER SPECIFIED POSTPROCEDURAL STATES: Chronic | ICD-10-CM

## 2025-06-19 DIAGNOSIS — Z88.5 ALLERGY STATUS TO NARCOTIC AGENT: ICD-10-CM

## 2025-06-19 DIAGNOSIS — R07.89 OTHER CHEST PAIN: ICD-10-CM
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
## 2025-06-19 DIAGNOSIS — Z87.39 PERSONAL HISTORY OF OTHER DISEASES OF THE MUSCULOSKELETAL SYSTEM AND CONNECTIVE TISSUE: ICD-10-CM

## 2025-06-19 DIAGNOSIS — Z91.040 LATEX ALLERGY STATUS: ICD-10-CM

## 2025-06-19 DIAGNOSIS — S22.42XA MULTIPLE FRACTURES OF RIBS, LEFT SIDE, INITIAL ENCOUNTER FOR CLOSED FRACTURE: ICD-10-CM

## 2025-06-19 DIAGNOSIS — Z88.8 ALLERGY STATUS TO OTHER DRUGS, MEDICAMENTS AND BIOLOGICAL SUBSTANCES: ICD-10-CM

## 2025-06-19 DIAGNOSIS — Z98.84 BARIATRIC SURGERY STATUS: Chronic | ICD-10-CM

## 2025-06-19 PROBLEM — E11.9 TYPE 2 DIABETES MELLITUS WITHOUT COMPLICATIONS: Chronic | Status: ACTIVE | Noted: 2024-10-24

## 2025-06-19 PROBLEM — M50.20 OTHER CERVICAL DISC DISPLACEMENT, UNSPECIFIED CERVICAL REGION: Chronic | Status: ACTIVE | Noted: 2024-10-24

## 2025-06-19 PROBLEM — M51.369 OTHER INTERVERTEBRAL DISC DEGENERATION, LUMBAR REGION WITHOUT MENTION OF LUMBAR BACK PAIN OR LOWER EXTREMITY PAIN: Chronic | Status: ACTIVE | Noted: 2024-10-24

## 2025-06-19 PROBLEM — M67.814 OTHER SPECIFIED DISORDERS OF TENDON, LEFT SHOULDER: Chronic | Status: ACTIVE | Noted: 2024-10-24

## 2025-06-19 LAB — TROPONIN I, HIGH SENSITIVITY RESULT: 6 NG/L — SIGNIFICANT CHANGE UP

## 2025-06-19 PROCEDURE — 93010 ELECTROCARDIOGRAM REPORT: CPT

## 2025-06-19 PROCEDURE — 99284 EMERGENCY DEPT VISIT MOD MDM: CPT

## 2025-06-19 PROCEDURE — 99053 MED SERV 10PM-8AM 24 HR FAC: CPT

## 2025-06-19 PROCEDURE — 71250 CT THORAX DX C-: CPT | Mod: 26

## 2025-06-19 RX ORDER — KETOROLAC TROMETHAMINE 30 MG/ML
30 INJECTION, SOLUTION INTRAMUSCULAR; INTRAVENOUS ONCE
Refills: 0 | Status: DISCONTINUED | OUTPATIENT
Start: 2025-06-19 | End: 2025-06-19

## 2025-06-19 RX ORDER — OXYCODONE HYDROCHLORIDE 30 MG/1
1 TABLET ORAL
Qty: 9 | Refills: 0
Start: 2025-06-19

## 2025-06-19 RX ORDER — TRAMADOL HYDROCHLORIDE 50 MG/1
50 TABLET, FILM COATED ORAL ONCE
Refills: 0 | Status: DISCONTINUED | OUTPATIENT
Start: 2025-06-19 | End: 2025-06-19

## 2025-06-19 RX ADMIN — KETOROLAC TROMETHAMINE 30 MILLIGRAM(S): 30 INJECTION, SOLUTION INTRAMUSCULAR; INTRAVENOUS at 08:53

## 2025-06-19 RX ADMIN — TRAMADOL HYDROCHLORIDE 50 MILLIGRAM(S): 50 TABLET, FILM COATED ORAL at 11:10

## 2025-06-19 RX ADMIN — KETOROLAC TROMETHAMINE 30 MILLIGRAM(S): 30 INJECTION, SOLUTION INTRAMUSCULAR; INTRAVENOUS at 09:30

## 2025-06-19 RX ADMIN — TRAMADOL HYDROCHLORIDE 50 MILLIGRAM(S): 50 TABLET, FILM COATED ORAL at 11:03
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
## 2025-06-19 NOTE — ED PROVIDER NOTE - NSFOLLOWUPINSTRUCTIONS_ED_ALL_ED_FT
Use incentive spirometer 10x hour while awake.    Take oxycodone for severe pain only.    A broken rib is a crack or break in one of the bones of the rib cage. Breathing can be very painful because the muscles used for breathing pull on the rib.    In most cases, a broken rib will heal on its own. You can take pain medicine while the rib mends. Pain relief allows you to take deep breaths. In the past, doctors recommended taping or wrapping broken ribs. This is no longer done because taping makes it hard for you to take deep breaths. Taking deep breaths may help prevent pneumonia or a partial collapse of a lung.    Your rib will heal in about 6 weeks.    You heal best when you take good care of yourself. Eat a variety of healthy foods, and don't smoke.    Follow-up care is a key part of your treatment and safety. Be sure to make and go to all appointments, and call your doctor or nurse advice line  if you are having problems. It's also a good idea to know your test results and keep a list of the medicines you take.    How can you care for yourself at home?  Be safe with medicines. Read and follow all instructions on the label.  If the doctor gave you a prescription medicine for pain, take it as prescribed.  If you are not taking a prescription pain medicine, ask your doctor if you can take an over-the-counter medicine.  Even if it hurts, try to cough or take the deepest breath you can at least once every hour. This will get air deeply into your lungs. This may reduce your chance of getting pneumonia or a partial collapse of a lung. Hold a pillow against your chest to make this less painful.  Put ice or a cold pack on the area for 10 to 20 minutes at a time. Put a thin cloth between the ice and your skin.  When should you call for help?  	  Call 911 anytime you think you may need emergency care. For example, call if:    You have severe trouble breathing.  Call your doctor or nurse advice line now or seek immediate medical care if:    You have some trouble breathing.  You have a fever.  You have a new or worse cough.  Watch closely for changes in your health, and be sure to contact your doctor or nurse advice line if:    You have pain even after taking your medicine.  You do not get better as expected.

## 2025-06-19 NOTE — ED PROVIDER NOTE - CARE PROVIDER_API CALL
Arnold Trujillo  Thoracic and Cardiac Surgery  [id number] [place] [id number]  Phone: [phone]  Fax: [phone]  Follow Up Time: Routine
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
## 2025-06-19 NOTE — ED PROVIDER NOTE - PATIENT PORTAL LINK FT
You can access the FollowMyHealth Patient Portal offered by [facility] by registering at the following website: [email or web address]. By joining [employer]s FollowMyHealth portal, you will also be able to view your health information using other applications (apps) compatible with our system.
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
## (undated) DEVICE — DRSG TEGADERM 4X4.75"

## (undated) DEVICE — BASIN EMESIS 10IN GRADUATED MAUVE

## (undated) DEVICE — S&N ARTHROCARE WAND TURBOVAC 90 DEGREE

## (undated) DEVICE — XI ARM GRASPER TIP UP FENESTRATED

## (undated) DEVICE — VENODYNE/SCD SLEEVE CALF LARGE

## (undated) DEVICE — COVIDIEN SIGNIA POWER CONTROL SHELL

## (undated) DEVICE — SUT FIBERWIRE #2 38" STRAND 1 BLUE 1 WHITE/BLACK

## (undated) DEVICE — DRSG BANDAID 0.75X3"

## (undated) DEVICE — POSITIONER S&N FACE MASK SPIDER 2

## (undated) DEVICE — DRSG 2X2

## (undated) DEVICE — SUT BIOSYN 4-0 18" P-12

## (undated) DEVICE — FLOORMAT SURGISAFE ABSORBANT WHITE 36" X 72"

## (undated) DEVICE — CONTAINER FORMALIN 80ML YELLOW

## (undated) DEVICE — TUBING SUCTION CONN 6FT STERILE

## (undated) DEVICE — DRSG CURITY GAUZE SPONGE 4 X 4" 12-PLY NON-STERILE

## (undated) DEVICE — NDL HYPO SAFE 22G X 1.5" (BLACK)

## (undated) DEVICE — CANNULA LINVATEC SHOULDER 7CM (GREY & ORANGE)

## (undated) DEVICE — LUBRICATING JELLY HR ONE SHOT 3G

## (undated) DEVICE — FOLEY HOLDER STATLOCK 2 WAY ADULT

## (undated) DEVICE — GLV 7.5 PROTEXIS (BLUE)

## (undated) DEVICE — NDL SPINAL 18G X 3.5" (PINK)

## (undated) DEVICE — TROCAR COVIDIEN VERSAPORT BLADELESS OPTICAL 5MM STANDARD

## (undated) DEVICE — DRSG MASTISOL

## (undated) DEVICE — DRSG STERISTRIPS 0.5 X 4"

## (undated) DEVICE — SUT POLYSORB 2-0 30" C-14 UNDYED

## (undated) DEVICE — SPECIMEN CONTAINER 100ML

## (undated) DEVICE — SUT NDL MAYO CATGUT 1/2 CIRCLE TAPER POINT 0.050" X 1.092"

## (undated) DEVICE — BLADE SCALPEL SAFETYLOCK #15

## (undated) DEVICE — DRAPE BACK TABLE COVER HEAVY DUTY 60"

## (undated) DEVICE — WARMING BLANKET LOWER ADULT

## (undated) DEVICE — GLV 8 PROTEXIS (WHITE)

## (undated) DEVICE — SUT VLOC 180 3-0 6" V-20 GREEN

## (undated) DEVICE — ELCTR ECG CONDUCTIVE ADHESIVE

## (undated) DEVICE — XI SEAL UNIV 5- 8 MM

## (undated) DEVICE — SYR LUER LOK 5CC

## (undated) DEVICE — PACK LAP CHOLE LAP APPENDECTOMY

## (undated) DEVICE — S&N ARTHROCARE WAND COBLATION WEREWOLF FLOW 90

## (undated) DEVICE — IRRIGATION TRAY W PISTON SYRINGE 60ML

## (undated) DEVICE — SUT POLYSORB 3-0 30" V-20 UNDYED

## (undated) DEVICE — VISIGI 3D ROUX-EN-Y 32FR

## (undated) DEVICE — LAP PAD W RING 18 X 18"

## (undated) DEVICE — LINE BREATHE SAMPLNG

## (undated) DEVICE — XI ARM NEEDLE DRIVER SUTURECUT MEGA 8MM

## (undated) DEVICE — BIOPSY FORCEP RADIAL JAW 4 STANDARD WITH NEEDLE

## (undated) DEVICE — SUT MONOCRYL 3-0 18" PS-2 UNDYED

## (undated) DEVICE — SOL IRR POUR NS 0.9% 500ML

## (undated) DEVICE — TUBING INSUFFLATION LAP FILTER 10FT

## (undated) DEVICE — MARKING PEN W RULER

## (undated) DEVICE — GLV 8 PROTEXIS (BLUE)

## (undated) DEVICE — BUR LINVATEC VORTEX 4.5 GREEN

## (undated) DEVICE — GOWN XL EXTRA LONG

## (undated) DEVICE — PLV-LINVATEC ARTHROSCOPIC TRAY: Type: DURABLE MEDICAL EQUIPMENT

## (undated) DEVICE — XI OBTURATOR OPTICAL BLADELESS 8MM

## (undated) DEVICE — INSUFFLATION NDL COVIDIEN STEP 14G FOR STEP/VERSASTEP

## (undated) DEVICE — DRAPE 3/4 SHEET W REINFORCEMENT 56X77"

## (undated) DEVICE — TUBING IV SET GRAVITY 3Y 100" MACRO

## (undated) DEVICE — DRAIN PENROSE .25" X 18" LATEX

## (undated) DEVICE — SOL IRR POUR H2O 250ML

## (undated) DEVICE — TUBING DEPUY MITEK FMS OUTFLOW

## (undated) DEVICE — PLV-SCD MACHINE: Type: DURABLE MEDICAL EQUIPMENT

## (undated) DEVICE — SYR LUER LOK 50CC

## (undated) DEVICE — XI DRAPE COLUMN

## (undated) DEVICE — XI ARM FORCEP CADIERE 8MM

## (undated) DEVICE — TROCAR COVIDIEN VERSASTEP PLUS 12MM STANDARD

## (undated) DEVICE — MEDICATION LABELS W MARKER

## (undated) DEVICE — UNDERPAD LINEN SAVER 17 X 24"

## (undated) DEVICE — XI ARM NEEDLE DRIVER MEGA

## (undated) DEVICE — DENTURE CUP PINK

## (undated) DEVICE — BIOPSY FORCEP COLD DISP

## (undated) DEVICE — TUBING STRYKEFLOW II SUCTION / IRRIGATOR

## (undated) DEVICE — PACK IV START WITH CHG

## (undated) DEVICE — CLAMP BX HOT RAD JAW 3

## (undated) DEVICE — PACK SHOULDER ARTHROSCOPY

## (undated) DEVICE — SOL IRR POUR H2O 1000ML

## (undated) DEVICE — GLV 7 PROTEXIS (WHITE)

## (undated) DEVICE — DRAPE U (BLUE) 60 X 60"

## (undated) DEVICE — SHAVER BLADE LINVATEC FULL RADIUS RESECTOR 3.5MM

## (undated) DEVICE — XI ARM PERMANENT CAUTERY HOOK

## (undated) DEVICE — SUT POLYSORB 2-0 30" V-20 UNDYED

## (undated) DEVICE — BLADE SCALPEL SAFETYLOCK #10

## (undated) DEVICE — DRAPE TOWEL BLUE 17" X 24"

## (undated) DEVICE — DRSG STOCKINETTE IMPERVIOUS LG 9 X 48"

## (undated) DEVICE — BITE BLOCK ADULT 20 X 27MM (GREEN)

## (undated) DEVICE — TROCAR COVIDIEN VERSASTEP PLUS 15MM STANDARD

## (undated) DEVICE — PLV/PSP-ESU FORCEFX F8J7721A: Type: DURABLE MEDICAL EQUIPMENT

## (undated) DEVICE — SUCTION YANKAUER NO CONTROL VENT

## (undated) DEVICE — SYR LUER LOK 20CC

## (undated) DEVICE — DRAPE MAYO STAND 30"

## (undated) DEVICE — GOWN LG

## (undated) DEVICE — SALIVA EJECTOR (BLUE)

## (undated) DEVICE — WARMING BLANKET UPPER ADULT

## (undated) DEVICE — SHEARS COVIDIEN ENDO SHEAR 5MM X 45CM LONG W MONOPOLAR CAUTERY

## (undated) DEVICE — TAPE SILK 3"

## (undated) DEVICE — SOL IRR BAG NS 0.9% 3000ML

## (undated) DEVICE — TUBING MEDI-VAC W MAXIGRIP CONNECTORS 1/4"X6'

## (undated) DEVICE — VENODYNE/SCD SLEEVE CALF MEDIUM

## (undated) DEVICE — POSITIONER FOAM EGG CRATE ULNAR 2PCS (PINK)

## (undated) DEVICE — TUBING DEPUY MITEK FMS INFLOW

## (undated) DEVICE — SHAVER BLADE GREAT WHITE 4.2MM

## (undated) DEVICE — STAPLER SKIN VISI-STAT 35 WIDE

## (undated) DEVICE — SYR LUER LOK 10CC

## (undated) DEVICE — GOWN TRIMAX LG

## (undated) DEVICE — PREP CHLORAPREP HI-LITE ORANGE 26ML

## (undated) DEVICE — XI VESSEL SEALER

## (undated) DEVICE — XI DRAPE ARM

## (undated) DEVICE — BUR LINVATEC OVAL 4MM

## (undated) DEVICE — SUT POLYSORB 0 30" GS-23

## (undated) DEVICE — D HELP - CLEARVIEW CLEARIFY SYSTEM

## (undated) DEVICE — SUT CLIP LAPRA-TY ABSORBABLE SIZE 0.118 TO 0.12" VIOLET

## (undated) DEVICE — XI ARM SCISSOR MONO CURVED

## (undated) DEVICE — CANNULA ARTHREX CRYSTAL SMOOTH 5.75MMX7MM ORANGE

## (undated) DEVICE — CATH IV SAFE BC 22G X 1" (BLUE)